# Patient Record
Sex: FEMALE | Race: WHITE | NOT HISPANIC OR LATINO | Employment: PART TIME | URBAN - METROPOLITAN AREA
[De-identification: names, ages, dates, MRNs, and addresses within clinical notes are randomized per-mention and may not be internally consistent; named-entity substitution may affect disease eponyms.]

---

## 2018-02-19 ENCOUNTER — OFFICE VISIT (OUTPATIENT)
Dept: URGENT CARE | Facility: CLINIC | Age: 38
End: 2018-02-19
Payer: COMMERCIAL

## 2018-02-19 VITALS
OXYGEN SATURATION: 97 % | SYSTOLIC BLOOD PRESSURE: 136 MMHG | WEIGHT: 124 LBS | HEIGHT: 64 IN | HEART RATE: 108 BPM | RESPIRATION RATE: 19 BRPM | TEMPERATURE: 99 F | BODY MASS INDEX: 21.17 KG/M2 | DIASTOLIC BLOOD PRESSURE: 89 MMHG

## 2018-02-19 DIAGNOSIS — R30.0 DYSURIA: Primary | ICD-10-CM

## 2018-02-19 DIAGNOSIS — J32.9 SINUSITIS, UNSPECIFIED CHRONICITY, UNSPECIFIED LOCATION: ICD-10-CM

## 2018-02-19 LAB
BILIRUB UR QL STRIP: NEGATIVE
GLUCOSE UR QL STRIP: NEGATIVE
KETONES UR QL STRIP: NEGATIVE
LEUKOCYTE ESTERASE UR QL STRIP: NEGATIVE
PH, POC UA: 7 (ref 5–8)
POC BLOOD, URINE: NEGATIVE
POC NITRATES, URINE: NEGATIVE
PROT UR QL STRIP: NEGATIVE
SP GR UR STRIP: 1 (ref 1–1.03)
UROBILINOGEN UR STRIP-ACNC: NORMAL (ref 0.1–1.1)

## 2018-02-19 PROCEDURE — 81003 URINALYSIS AUTO W/O SCOPE: CPT | Mod: QW,S$GLB,, | Performed by: EMERGENCY MEDICINE

## 2018-02-19 PROCEDURE — 3008F BODY MASS INDEX DOCD: CPT | Mod: S$GLB,,, | Performed by: NURSE PRACTITIONER

## 2018-02-19 PROCEDURE — 99214 OFFICE O/P EST MOD 30 MIN: CPT | Mod: 25,S$GLB,, | Performed by: NURSE PRACTITIONER

## 2018-02-19 RX ORDER — AMOXICILLIN AND CLAVULANATE POTASSIUM 875; 125 MG/1; MG/1
1 TABLET, FILM COATED ORAL 2 TIMES DAILY
Qty: 20 TABLET | Refills: 0 | Status: SHIPPED | OUTPATIENT
Start: 2018-02-19 | End: 2018-03-01

## 2018-02-20 NOTE — PATIENT INSTRUCTIONS
"Please return here or go to the Emergency Department for any concerns or worsening of condition.  If you were prescribed antibiotics, please take them to completion.  If you were prescribed a narcotic medication, do not drive or operate heavy equipment or machinery while taking these medications.  Please follow up with your primary care doctor or specialist as needed.  Please drink plenty of fluids.  Please get plenty of rest.  If you were prescribed Pyridium (phenazopyridine), please be aware that if you wear contact lens that this medication may stain your contacts.  While taking this medication it is recommended that you do not wear your contacts until 24 hours after your last dose.  Please follow up with your primary care doctor or specialist as needed.    If you  smoke, please stop smoking.  Dysuria     Painful urination (dysuria) is often caused by a problem in the urinary tract.   Dysuria is pain felt during urination. It is often described as a burning. Learn more about this problem and how it can be treated.  What causes dysuria?  Possible causes include:  · Infection with a bacteria or virus such as a urinary tract infection (UTI or a sexually transmitted infection (STI)  · Sensitivity or allergy to chemicals such as those found in lotions and other products  · Prostate or bladder problems  · Radiation therapy to the pelvic area  How is dysuria diagnosed?  Your healthcare provider will examine you. He or she will ask about your symptoms and health. After talking with you and doing a physical exam, your healthcare provider may know what is causing your dysuria. He or she will usually request  a sample of your urine. Tests of your urine, or a "urinalysis," are done. A urinalysis may include:  · Looking at the urine sample (visual exam)  · Checking for substances (chemical exam)  · Looking at a small amount under a microscope (microscopic exam)  Some parts of the urinalysis may be done in the provider's " office and some in a lab. And, the urine sample may be checked for bacteria and yeast (urine culture). Your healthcare provider will tell you more about these tests if they are needed.  How is dysuria treated?  Treatment depends on the cause. If you have a bacterial infection, you may need antibiotics. You may be given medicines to make it easier for you to urinate and help relieve pain. Your healthcare provider can tell you more about your treatment options. Untreated, symptoms may get worse.  When to call your healthcare provider  Call the healthcare provider right away if you have any of the following:  · Fever of 100.4°F (38°C) or higher   · No improvement after three days of treatment  · Trouble urinating because of pain  · New or increased discharge from the vagina or penis  · Rash or joint pain  · Increased back or abdominal pain  · Enlarged painful lymph nodes (lumps) in the groin   Date Last Reviewed: 1/1/2017  © 0062-6194 [employer]. [address] [place] [address]. All rights reserved. This information is not intended as a substitute for professional medical care. Always follow your healthcare professional's instructions.        Sinusitis (Antibiotic Treatment)    The sinuses are air-filled spaces within the bones of the face. They connect to the inside of the nose. Sinusitis is an inflammation of the tissue lining the sinus cavity. Sinus inflammation can occur during a cold. It can also be due to allergies to pollens and other particles in the air. Sinusitis can cause symptoms of sinus congestion and fullness. A sinus infection causes fever, headache and facial pain. There is often green or yellow drainage from the nose or into the back of the throat (post-nasal drip). You have been given antibiotics to treat this condition.  Home care:  · Take the full course of antibiotics as instructed. Do not stop taking them, even if you feel better.  · Drink plenty of water, hot tea, and  other liquids. This may help thin mucus. It also may promote sinus drainage.  · Heat may help soothe painful areas of the face. Use a towel soaked in hot water. Or,  the shower and direct the hot spray onto your face. Using a vaporizer along with a menthol rub at night may also help.   · An expectorant containing guaifenesin may help thin the mucus and promote drainage from the sinuses.  · Over-the-counter decongestants may be used unless a similar medicine was prescribed. Nasal sprays work the fastest. Use one that contains phenylephrine or oxymetazoline. First blow the nose gently. Then use the spray. Do not use these medicines more often than directed on the label or symptoms may get worse. You may also use tablets containing pseudoephedrine. Avoid products that combine ingredients, because side effects may be increased. Read labels. You can also ask the pharmacist for help. (NOTE: Persons with high blood pressure should not use decongestants. They can raise blood pressure.)  · Over-the-counter antihistamines may help if allergies contributed to your sinusitis.    · Do not use nasal rinses or irrigation during an acute sinus infection, unless told to by your health care provider. Rinsing may spread the infection to other sinuses.  · Use acetaminophen or ibuprofen to control pain, unless another pain medicine was prescribed. (If you have chronic liver or kidney disease or ever had a stomach ulcer, talk with your doctor before using these medicines. Aspirin should never be used in anyone under 18 years of age who is ill with a fever. It may cause severe liver damage.)  · Don't smoke. This can worsen symptoms.  Follow-up care  Follow up with your healthcare provider or our staff if you are not improving within the next week.  When to seek medical advice  Call your healthcare provider if any of these occur:  · Facial pain or headache becoming more severe  · Stiff neck  · Unusual drowsiness or  confusion  · Swelling of the forehead or eyelids  · Vision problems, including blurred or double vision  · Fever of 100.4ºF (38ºC) or higher, or as directed by your healthcare provider  · Seizure  · Breathing problems  · Symptoms not resolving within 10 days  Date Last Reviewed: 4/13/2015  © 5148-4356 [employer]. [address] [place] [address], [place] [address]. All rights reserved. This information is not intended as a substitute for professional medical care. Always follow your healthcare professional's instructions.

## 2018-02-20 NOTE — PROGRESS NOTES
"Subjective:       Patient ID: Frida Benton is a 37 y.o. female.    Vitals:  height is 5' 4" (1.626 m) and weight is 56.2 kg (124 lb). Her oral temperature is 99 °F (37.2 °C). Her blood pressure is 136/89 and her pulse is 108. Her respiration is 19 and oxygen saturation is 97%.     Chief Complaint: Urinary Tract Infection    Patient states she been having some uti symptoms since this morning.  PATIENT ALSO REPORTS SINUS PRESSURE, HEADACHE AND COUGH X SEVERAL DAYS      Urinary Tract Infection    This is a new problem. The current episode started today. The problem has been gradually worsening. The quality of the pain is described as burning. The pain is at a severity of 2/10. The pain is mild. There has been no fever. Associated symptoms include frequency and urgency. Pertinent negatives include no chills, hematuria, nausea, possible pregnancy or vomiting. She has tried nothing for the symptoms. The treatment provided no relief.     Review of Systems   Constitution: Negative for chills and fever.   Skin: Negative for itching.   Musculoskeletal: Negative for back pain.   Gastrointestinal: Negative for abdominal pain, nausea and vomiting.   Genitourinary: Positive for frequency and urgency. Negative for dysuria, genital sores, hematuria, missed menses, non-menstrual bleeding and pelvic pain.       Objective:      Physical Exam   Constitutional: She is oriented to person, place, and time. She appears well-developed and well-nourished.   HENT:   Head: Normocephalic and atraumatic.   Right Ear: External ear normal.   Left Ear: External ear normal.   Nose: No nasal deformity. No epistaxis. Right sinus exhibits maxillary sinus tenderness. Left sinus exhibits maxillary sinus tenderness.   Mouth/Throat: Oropharynx is clear and moist and mucous membranes are normal.   Eyes: Conjunctivae and lids are normal.   Neck: Trachea normal, normal range of motion and phonation normal. Neck supple.   Cardiovascular: Normal rate, regular " rhythm, normal heart sounds and normal pulses.    Pulmonary/Chest: Effort normal and breath sounds normal.   Abdominal: Soft. Normal appearance and bowel sounds are normal. She exhibits no distension and no mass. There is no tenderness. There is no CVA tenderness.   Neurological: She is alert and oriented to person, place, and time.   Skin: Skin is warm, dry and intact.   Psychiatric: She has a normal mood and affect. Her speech is normal and behavior is normal. Cognition and memory are normal.   Nursing note and vitals reviewed.      Office Visit on 02/19/2018   Component Date Value Ref Range Status    POC Blood, Urine 02/19/2018 Negative  Negative Final    POC Bilirubin, Urine 02/19/2018 Negative  Negative Final    POC Urobilinogen, Urine 02/19/2018 Normal  0.1 - 1.1 Final    POC Ketones, Urine 02/19/2018 Negative  Negative Final    POC Protein, Urine 02/19/2018 Negative  Negative Final    POC Nitrates, Urine 02/19/2018 Negative  Negative Final    POC Glucose, Urine 02/19/2018 Negative  Negative Final    pH, UA 02/19/2018 7.0  5 - 8 Final    POC Specific Gravity, Urine 02/19/2018 1.005  1.003 - 1.029 Final    POC Leukocytes, Urine 02/19/2018 Negative  Negative Final     Assessment:       1. Dysuria    2. Sinusitis, unspecified chronicity, unspecified location        Plan:         Dysuria  -     POCT Urinalysis, Dipstick, Automated, W/O Scope  -     Urine culture  -     amoxicillin-clavulanate 875-125mg (AUGMENTIN) 875-125 mg per tablet; Take 1 tablet by mouth 2 (two) times daily.  Dispense: 20 tablet; Refill: 0    Sinusitis, unspecified chronicity, unspecified location  -     amoxicillin-clavulanate 875-125mg (AUGMENTIN) 875-125 mg per tablet; Take 1 tablet by mouth 2 (two) times daily.  Dispense: 20 tablet; Refill: 0      Patient Instructions     Please return here or go to the Emergency Department for any concerns or worsening of condition.  If you were prescribed antibiotics, please take them to  "completion.  If you were prescribed a narcotic medication, do not drive or operate heavy equipment or machinery while taking these medications.  Please follow up with your primary care doctor or specialist as needed.  Please drink plenty of fluids.  Please get plenty of rest.  If you were prescribed Pyridium (phenazopyridine), please be aware that if you wear contact lens that this medication may stain your contacts.  While taking this medication it is recommended that you do not wear your contacts until 24 hours after your last dose.  Please follow up with your primary care doctor or specialist as needed.    If you  smoke, please stop smoking.  Dysuria     Painful urination (dysuria) is often caused by a problem in the urinary tract.   Dysuria is pain felt during urination. It is often described as a burning. Learn more about this problem and how it can be treated.  What causes dysuria?  Possible causes include:  · Infection with a bacteria or virus such as a urinary tract infection (UTI or a sexually transmitted infection (STI)  · Sensitivity or allergy to chemicals such as those found in lotions and other products  · Prostate or bladder problems  · Radiation therapy to the pelvic area  How is dysuria diagnosed?  Your healthcare provider will examine you. He or she will ask about your symptoms and health. After talking with you and doing a physical exam, your healthcare provider may know what is causing your dysuria. He or she will usually request  a sample of your urine. Tests of your urine, or a "urinalysis," are done. A urinalysis may include:  · Looking at the urine sample (visual exam)  · Checking for substances (chemical exam)  · Looking at a small amount under a microscope (microscopic exam)  Some parts of the urinalysis may be done in the provider's office and some in a lab. And, the urine sample may be checked for bacteria and yeast (urine culture). Your healthcare provider will tell you more about these " tests if they are needed.  How is dysuria treated?  Treatment depends on the cause. If you have a bacterial infection, you may need antibiotics. You may be given medicines to make it easier for you to urinate and help relieve pain. Your healthcare provider can tell you more about your treatment options. Untreated, symptoms may get worse.  When to call your healthcare provider  Call the healthcare provider right away if you have any of the following:  · Fever of 100.4°F (38°C) or higher   · No improvement after three days of treatment  · Trouble urinating because of pain  · New or increased discharge from the vagina or penis  · Rash or joint pain  · Increased back or abdominal pain  · Enlarged painful lymph nodes (lumps) in the groin   Date Last Reviewed: 1/1/2017 © 2000-2017 [employer]. [address] [place] [address]. All rights reserved. This information is not intended as a substitute for professional medical care. Always follow your healthcare professional's instructions.        Sinusitis (Antibiotic Treatment)    The sinuses are air-filled spaces within the bones of the face. They connect to the inside of the nose. Sinusitis is an inflammation of the tissue lining the sinus cavity. Sinus inflammation can occur during a cold. It can also be due to allergies to pollens and other particles in the air. Sinusitis can cause symptoms of sinus congestion and fullness. A sinus infection causes fever, headache and facial pain. There is often green or yellow drainage from the nose or into the back of the throat (post-nasal drip). You have been given antibiotics to treat this condition.  Home care:  · Take the full course of antibiotics as instructed. Do not stop taking them, even if you feel better.  · Drink plenty of water, hot tea, and other liquids. This may help thin mucus. It also may promote sinus drainage.  · Heat may help soothe painful areas of the face. Use a towel soaked in hot water.  Or,  the shower and direct the hot spray onto your face. Using a vaporizer along with a menthol rub at night may also help.   · An expectorant containing guaifenesin may help thin the mucus and promote drainage from the sinuses.  · Over-the-counter decongestants may be used unless a similar medicine was prescribed. Nasal sprays work the fastest. Use one that contains phenylephrine or oxymetazoline. First blow the nose gently. Then use the spray. Do not use these medicines more often than directed on the label or symptoms may get worse. You may also use tablets containing pseudoephedrine. Avoid products that combine ingredients, because side effects may be increased. Read labels. You can also ask the pharmacist for help. (NOTE: Persons with high blood pressure should not use decongestants. They can raise blood pressure.)  · Over-the-counter antihistamines may help if allergies contributed to your sinusitis.    · Do not use nasal rinses or irrigation during an acute sinus infection, unless told to by your health care provider. Rinsing may spread the infection to other sinuses.  · Use acetaminophen or ibuprofen to control pain, unless another pain medicine was prescribed. (If you have chronic liver or kidney disease or ever had a stomach ulcer, talk with your doctor before using these medicines. Aspirin should never be used in anyone under 18 years of age who is ill with a fever. It may cause severe liver damage.)  · Don't smoke. This can worsen symptoms.  Follow-up care  Follow up with your healthcare provider or our staff if you are not improving within the next week.  When to seek medical advice  Call your healthcare provider if any of these occur:  · Facial pain or headache becoming more severe  · Stiff neck  · Unusual drowsiness or confusion  · Swelling of the forehead or eyelids  · Vision problems, including blurred or double vision  · Fever of 100.4ºF (38ºC) or higher, or as directed by your healthcare  provider  · Seizure  · Breathing problems  · Symptoms not resolving within 10 days  Date Last Reviewed: 4/13/2015  © 6200-7387 [employer]. [address] [place] [address], [place] [address]. All rights reserved. This information is not intended as a substitute for professional medical care. Always follow your healthcare professional's instructions.

## 2018-02-24 LAB
BACTERIA UR CULT: ABNORMAL
BACTERIA UR CULT: ABNORMAL

## 2018-02-26 ENCOUNTER — TELEPHONE (OUTPATIENT)
Dept: URGENT CARE | Facility: CLINIC | Age: 38
End: 2018-02-26

## 2018-02-26 NOTE — TELEPHONE ENCOUNTER
----- Message from Zulema Montanez MD sent at 2/24/2018  9:44 AM CST -----  Notify the patient that their  lab tests did result abnormal.Pt on augmentin because she also had a sinus infection. That should work. Check how she is doing. If still having urinary symptoms I may have to phone in a different medication. However they  have already been given the appropriate medication and should finish the meds as prescribed

## 2018-03-07 ENCOUNTER — TELEPHONE (OUTPATIENT)
Dept: URGENT CARE | Facility: CLINIC | Age: 38
End: 2018-03-07

## 2018-04-03 ENCOUNTER — OFFICE VISIT (OUTPATIENT)
Dept: OBSTETRICS AND GYNECOLOGY | Facility: CLINIC | Age: 38
End: 2018-04-03
Payer: COMMERCIAL

## 2018-04-03 VITALS
HEIGHT: 64 IN | WEIGHT: 128.31 LBS | DIASTOLIC BLOOD PRESSURE: 74 MMHG | BODY MASS INDEX: 21.91 KG/M2 | SYSTOLIC BLOOD PRESSURE: 120 MMHG

## 2018-04-03 DIAGNOSIS — Z12.4 PAP SMEAR FOR CERVICAL CANCER SCREENING: Primary | ICD-10-CM

## 2018-04-03 PROCEDURE — 88175 CYTOPATH C/V AUTO FLUID REDO: CPT

## 2018-04-03 PROCEDURE — 99999 PR PBB SHADOW E&M-EST. PATIENT-LVL III: CPT | Mod: PBBFAC,,, | Performed by: OBSTETRICS & GYNECOLOGY

## 2018-04-03 PROCEDURE — 87624 HPV HI-RISK TYP POOLED RSLT: CPT

## 2018-04-03 PROCEDURE — 99385 PREV VISIT NEW AGE 18-39: CPT | Mod: S$GLB,,, | Performed by: OBSTETRICS & GYNECOLOGY

## 2018-04-03 NOTE — PROGRESS NOTES
"Chief Complaint: Well Woman Exam     HPI:      Frida Benton is a 37 y.o.  who presents today for well woman exam.  LMP: Patient's last menstrual period was 2018 (approximate).  No issues, problems, or complaints. Specifically, patient denies abnormal vaginal bleeding, discharge, pelvic pain, urinary problems, or changes in appetite. Ms. Benton is currently sexually active with a single male partner. She is currently using no method for contraception as she and her partner are open to conception.    Previous Pap:  no abnormalities per patient ()    Ms. Benton confirms that she wears her seatbelt when riding in the car and does not text while driving.     OB History      Para Term  AB Living    1 1 1     1    SAB TAB Ectopic Multiple Live Births            1        ROS:     GENERAL: Denies unintentional weight gain or weight loss. Feeling well overall.   SKIN: Denies rash or lesions.   HEENT: Denies headaches, or vision changes.   CARDIOVASCULAR: Denies palpitations or chest pain.   RESPIRATORY: Denies shortness of breath or dyspnea on exertion.  BREASTS: Denies pain, lumps, or nipple discharge.   ABDOMEN: Denies abdominal pain, constipation, diarrhea, nausea, vomiting, change in appetite.  URINARY: Denies frequency, dysuria, hematuria.  NEUROLOGIC: Denies syncope or weakness.   PSYCHIATRIC: Denies depression, anxiety or mood swings.    Physical Exam:      PHYSICAL EXAM:  /74   Ht 5' 4" (1.626 m)   Wt 58.2 kg (128 lb 4.9 oz)   LMP 2018 (Approximate)   BMI 22.02 kg/m²   Body mass index is 22.02 kg/m².     APPEARANCE: Well nourished, well developed, in no acute distress.  PSYCH: Appropriate mood and affect.  SKIN: No acne or hirsutism  NECK: Neck symmetric without masses or thyromegaly  NODES: No inguinal, axillary, or supraclavicular lymph node enlargement  ABDOMEN: Soft.  No tenderness or masses.    CARDIOVASCULAR: No edema of peripheral extremities  BREASTS: Symmetrical, " no skin changes or visible lesions.  Bilateral lumpy bumpy quality to both breasts; no nipple discharge bilaterally.  PELVIC: Normal external genitalia without lesions.  Normal hair distribution.  Adequate perineal body, normal urethral meatus.  Vagina moist and well rugated without lesions or discharge.  Cervix pink, without lesions, discharge or tenderness.  No significant cystocele or rectocele.  Bimanual exam shows uterus to be normal size, regular, mobile and nontender.  Adnexa without masses or tenderness.      Assessment/Plan:     Pap smear for cervical cancer screening  -     Liquid-based pap smear, screening  -     HPV High Risk Genotypes, PCR      Counseling:     Patient was counseled today on current ASCCP pap guidelines, the recommendation for yearly pelvic exams, healthy diet and exercise routines, breast self awareness.She is to see her PCP for other health maintenance.     Use of the [facility] Patient Portal discussed and encouraged during today's visit.

## 2018-04-06 LAB
HPV16 AG SPEC QL: NEGATIVE
HPV16+18+H RISK 12 DNA CVX-IMP: POSITIVE
HPV18 DNA SPEC QL NAA+PROBE: NEGATIVE

## 2018-04-07 ENCOUNTER — PATIENT MESSAGE (OUTPATIENT)
Dept: OBSTETRICS AND GYNECOLOGY | Facility: CLINIC | Age: 38
End: 2018-04-07

## 2018-04-10 ENCOUNTER — PATIENT MESSAGE (OUTPATIENT)
Dept: OBSTETRICS AND GYNECOLOGY | Facility: CLINIC | Age: 38
End: 2018-04-10

## 2018-04-10 ENCOUNTER — PATIENT MESSAGE (OUTPATIENT)
Dept: OBSTETRICS AND GYNECOLOGY | Facility: HOSPITAL | Age: 38
End: 2018-04-10

## 2018-12-29 ENCOUNTER — OFFICE VISIT (OUTPATIENT)
Dept: URGENT CARE | Facility: CLINIC | Age: 38
End: 2018-12-29
Payer: COMMERCIAL

## 2018-12-29 VITALS
WEIGHT: 128 LBS | DIASTOLIC BLOOD PRESSURE: 76 MMHG | SYSTOLIC BLOOD PRESSURE: 120 MMHG | OXYGEN SATURATION: 100 % | HEIGHT: 64 IN | HEART RATE: 99 BPM | BODY MASS INDEX: 21.85 KG/M2 | TEMPERATURE: 100 F | RESPIRATION RATE: 16 BRPM

## 2018-12-29 DIAGNOSIS — R10.84 COLICKY ABDOMINAL PAIN: ICD-10-CM

## 2018-12-29 DIAGNOSIS — K59.00 CONSTIPATION, UNSPECIFIED CONSTIPATION TYPE: ICD-10-CM

## 2018-12-29 DIAGNOSIS — R10.31 RIGHT LOWER QUADRANT ABDOMINAL PAIN: Primary | ICD-10-CM

## 2018-12-29 DIAGNOSIS — R50.9 FEVER, UNSPECIFIED FEVER CAUSE: ICD-10-CM

## 2018-12-29 LAB
B-HCG UR QL: NEGATIVE
BILIRUB UR QL STRIP: NEGATIVE
CTP QC/QA: YES
GLUCOSE UR QL STRIP: NEGATIVE
KETONES UR QL STRIP: POSITIVE
LEUKOCYTE ESTERASE UR QL STRIP: NEGATIVE
PH, POC UA: 6.5 (ref 5–8)
POC BLOOD, URINE: NEGATIVE
POC NITRATES, URINE: NEGATIVE
PROT UR QL STRIP: NEGATIVE
SP GR UR STRIP: 1.01 (ref 1–1.03)
UROBILINOGEN UR STRIP-ACNC: NORMAL (ref 0.1–1.1)

## 2018-12-29 PROCEDURE — 81003 URINALYSIS AUTO W/O SCOPE: CPT | Mod: QW,S$GLB,, | Performed by: NURSE PRACTITIONER

## 2018-12-29 PROCEDURE — 74019 RADEX ABDOMEN 2 VIEWS: CPT | Mod: S$GLB,,, | Performed by: RADIOLOGY

## 2018-12-29 PROCEDURE — 81025 URINE PREGNANCY TEST: CPT | Mod: S$GLB,,, | Performed by: NURSE PRACTITIONER

## 2018-12-29 PROCEDURE — 99214 OFFICE O/P EST MOD 30 MIN: CPT | Mod: 25,S$GLB,, | Performed by: NURSE PRACTITIONER

## 2018-12-29 PROCEDURE — 3008F BODY MASS INDEX DOCD: CPT | Mod: CPTII,S$GLB,, | Performed by: NURSE PRACTITIONER

## 2018-12-29 RX ORDER — DICYCLOMINE HYDROCHLORIDE 20 MG/1
20 TABLET ORAL 3 TIMES DAILY PRN
Qty: 30 TABLET | Refills: 0 | Status: SHIPPED | OUTPATIENT
Start: 2018-12-29 | End: 2019-04-22

## 2018-12-29 NOTE — PATIENT INSTRUCTIONS
Follow up with your doctor in a few days.  Return to the urgent care or go to the ER if symptoms get worse.    Xray shows signs of constipation. Follow the otc guidelines below and follow up if no improvement.  If the xray shows something in addition once radiology do the final read, we willc all you.   ER precautions: pain severe, with nausea/vomiting.  Follow up with primary care if symptoms persist.  Tylenol and/or motrin for fever.      TAKE MIRALAX OTC TWICE DAILY AS NEEDED, ALONG WITH COLACE AS DIRECTED.  TAKE FIBER, METAMUCIL, AND ADD TO YOUR DIET DAILY.  INCREASE FLUID INTAKE TO 8-10 GLASSES/WATER DAILY.   DULCOLAX SUPPOSITORY AS DIRECTED AND NEEDED FOR CONSTIPATION.    PLEASE FOLLOW UP WITH PRIMARY CARE IF SYMPTOMS DO NOT IMPROVE WITH BOWEL MOVEMENTS.    SEE HANDOUT ON CONSTIPATION AND ABDOMINAL PAIN.      Constipation (Adult)  Constipation means that you have bowel movements that are less frequent than usual. Stools often become very hard and difficult to pass.  Constipation is very common. At some point in life it affects almost everyone. Since everyone's bowel habits are different, what is constipation to one person may not be to another. Your healthcare provider may do tests to diagnose constipation. It depends on what he or she finds when evaluating you.    Symptoms of constipation include:  · Abdominal pain  · Bloating  · Vomiting  · Painful bowel movements  · Itching, swelling, bleeding, or pain around the anus  Causes  Constipation can have many causes. These include:  · Diet low in fiber  · Too much dairy  · Not drinking enough liquids  · Lack of exercise or physical activity. This is especially true for older adults.  · Changes in lifestyle or daily routine, including pregnancy, aging, work, and travel  · Frequent use or misuse of laxatives  · Ignoring the urge to have a bowel movement or delaying it until later  · Medicines, such as certain prescription pain medicines, iron supplements, antacids,  certain antidepressants, and calcium supplements  · Diseases like irritable bowel syndrome, bowel obstructions, stroke, diabetes, thyroid disease, Parkinson disease, hemorrhoids, and colon cancer  Complications  Potential complications of constipation can include:  · Hemorrhoids  · Rectal bleeding from hemorrhoids or anal fissures (skin tears)  · Hernias  · Dependency on laxatives  · Chronic constipation  · Fecal impaction  · Bowel obstruction or perforation  Home care  All treatment should be done after talking with your healthcare provider. This is especially true if you have another medical problems, are taking prescription medicines, or are an older adult. Treatment most often involves lifestyle changes. You may also need medicines. Your healthcare provider will tell you which will work best for you. Follow the advice below to help avoid this problem in the future.  Lifestyle changes  These lifestyle changes can help prevent constipation:  · Diet. Eat a high-fiber diet, with fresh fruit and vegetables, and reduce dairy intake, meats, and processed foods  · Fluids. It's important to get enough fluids each day. Drink plenty of water when you eat more fiber. If you are on diet that limits the amount of fluid you can have, talk about this with your healthcare provider.  · Regular exercise. Check with your healthcare provider first.  Medications  Take any medicines as directed. Some laxatives are safe to use only every now and then. Others can be taken on a regular basis. Talk with your doctor or pharmacist if you have questions.  Prescription pain medicines can cause constipation. If you are taking this kind of medicine, ask your healthcare provider if you should also take a stool softener.  Medicines you may take to treat constipation include:  · Fiber supplements  · Stool softeners  · Laxatives  · Enemas  · Rectal suppositories  Follow-up care  Follow up with your healthcare provider if symptoms don't get better  in the next few days. You may need to have more tests or see a specialist.  Call 911  Call 911 if any of these occur:  · Trouble breathing  · Stiff, rigid abdomen that is severely painful to touch  · Confusion  · Fainting or loss of consciousness  · Rapid heart rate  · Chest pain  When to seek medical advice  Call your healthcare provider right away if any of these occur:  · Fever over 100.4°F (38°C)  · Failure to resume normal bowel movements  · Pain in your abdomen or back gets worse  · Nausea or vomiting  · Swelling in your abdomen  · Blood in the stool  · Black, tarry stool  · Involuntary weight loss  · Weakness  Date Last Reviewed: 12/30/2015  © 8319-2944 [employer]. [address] [place] [address], [place] [address]. All rights reserved. This information is not intended as a substitute for professional medical care. Always follow your healthcare professional's instructions.        Unknown Causes of Abdominal Pain (Female)    The exact cause of your abdominal (stomach) pain is not clear. This does not mean that this is something to worry about. Everyone likes to know the exact cause of the problem, but sometimes with abdominal pain, there is no clear-cut cause, and this could be a good thing. The good news is that your symptoms can be treated, and you will feel better.   Your condition does not seem serious now; however, sometimes the signs of a serious problem may take more time to appear. For this reason, it is important for you to watch for any new symptoms, problems, or worsening of your condition.  Over the next few days, the abdominal pain may come and go, or be continuous. Other common symptoms can include nausea and vomiting. Sometimes it can be difficult to tell if you feel nauseous, you may just feel bad and not associate that feeling with nausea. Constipation, diarrhea, and a fever may go along with the pain.  The pain may continue even if treated correctly over the following days. Depending on  how things go, sometimes the cause can become clear and may require further or different treatment. Additional evaluations, medications, or tests may also be needed.  Home care  Your healthcare provider may prescribe medicine for pain, symptoms, or an infection.  Follow the healthcare provider's instructions for taking these medicines.  General care  · Rest as much as you can until your next exam. No strenuous activities.  · Try to find positions that ease discomfort. A small pillow placed on the abdomen may help relieve pain.  · Something warm on your abdomen (such as a heating pad) may help, but be careful not to burn yourself.  Diet  · Do not force yourself to eat, especially if having cramps, vomiting, or diarrhea.  · Water is important so you do not get dehydrated. Soup may also be good. Sports drinks may also help, especially if they are not too acidic. Make sure you don't drink sugary drinks as this can make things worse. Take liquids in small amounts. Do not guzzle them.  · Caffeine sometimes makes the pain and cramping worse.  · Avoid dairy products if you have vomiting or diarrhea.  · Don't eat large amounts at a time. Wait a few minutes between bites.  · Eat a diet low in fiber (called a low-residue diet). Foods allowed include refined breads, white rice, fruit and vegetable juices without pulp, tender meats. These foods will pass more easily through the intestine.  · Avoid whole-grain foods, whole fruits and vegetables, meats, seeds and nuts, fried or fatty foods, dairy, alcohol and spicy foods until your symptoms go away.  Follow-up care  Follow up with your healthcare provider, or as advised, if your pain does not begin to improve in the next 24 hours.  Call 911  Call 911 if any of these occur:  · Trouble breathing  · Confusion  · Fainting or loss of consciousness  · Rapid heart rate  · Seizure  When to seek medical advice  Call your healthcare provider right away if any of these occur:  · Pain gets  worse or moves to the right lower abdomen  · New or worsening vomiting or diarrhea  · Swelling of the abdomen  · Unable to pass stool for more than 3 days  · Fever of 100.4ºF (38ºC) or higher, or as directed by your healthcare provider.  · Blood in vomit or bowel movements (dark red or black color)  · Jaundice (yellow color of eyes and skin)  · Weakness, dizziness  · Chest, arm, back, neck or jaw pain  · Unexpected vaginal bleeding or missed period  · Can't keep down liquids or water and are getting dehydrated  Date Last Reviewed: 12/30/2015  © 5152-8993 [employer]. [address] [place] [address], [place] [address]. All rights reserved. This information is not intended as a substitute for professional medical care. Always follow your healthcare professional's instructions.

## 2018-12-29 NOTE — PROGRESS NOTES
"Subjective:       Patient ID: Frida Benton is a 38 y.o. female.    Vitals:  height is 5' 4" (1.626 m) and weight is 58.1 kg (128 lb). Her temperature is 100.2 °F (37.9 °C). Her blood pressure is 120/76 and her pulse is 99. Her respiration is 16 and oxygen saturation is 100%.     Chief Complaint: Fever    Pt states she has abdominal cramps and fever x 2 days. No hx of renal stones, uti, appendicitis, diverticulitus, reflux. Denies n/v/d. Reports last stool this morning-normal. Reports pain comes and goes-diffuse crampy pain. Reports cycle to start in 10-11 days. Reports she has been trying to become pregnant-so no birth control method at this time. No hx of ovarian cyst. Denies vaginal bleeding.      Fever    This is a new problem. The current episode started yesterday. The problem has been unchanged. Associated symptoms include abdominal pain. Pertinent negatives include no congestion, coughing, ear pain, nausea, rash, sore throat, vomiting or wheezing.       Constitution: Positive for fever. Negative for chills, sweating and fatigue.   HENT: Negative for ear pain, congestion, sinus pain, sinus pressure, sore throat and voice change.    Neck: Negative for painful lymph nodes.   Eyes: Negative for eye redness.   Respiratory: Negative for chest tightness, cough, sputum production, bloody sputum, COPD, shortness of breath, stridor, wheezing and asthma.    Gastrointestinal: Positive for abdominal pain. Negative for nausea and vomiting.   Musculoskeletal: Negative for muscle ache.   Skin: Negative for rash.   Allergic/Immunologic: Negative for seasonal allergies and asthma.   Hematologic/Lymphatic: Negative for swollen lymph nodes.       Objective:      Physical Exam   Constitutional: She is oriented to person, place, and time. She appears well-developed and well-nourished.   HENT:   Head: Normocephalic and atraumatic.   Right Ear: External ear normal.   Left Ear: External ear normal.   Nose: Nose normal.   Mouth/Throat: " Mucous membranes are normal.   Eyes: Conjunctivae and lids are normal.   Neck: Trachea normal and full passive range of motion without pain. Neck supple.   Cardiovascular: Normal rate, regular rhythm and normal heart sounds.   Pulmonary/Chest: Effort normal and breath sounds normal. No respiratory distress.   Abdominal: Soft. Normal appearance and bowel sounds are normal. She exhibits no distension, no abdominal bruit, no pulsatile midline mass and no mass. There is tenderness in the right lower quadrant. There is no rigidity, no rebound, no guarding, no CVA tenderness, no tenderness at McBurney's point and negative [place]'s sign.   Able to jump off table-negative heel jar test   Musculoskeletal: Normal range of motion. She exhibits no edema.   Neurological: She is alert and oriented to person, place, and time. She has normal strength.   Skin: Skin is warm, dry and intact. She is not diaphoretic. No pallor.   Psychiatric: She has a normal mood and affect. Her speech is normal and behavior is normal. Judgment and thought content normal. Cognition and memory are normal.   Nursing note and vitals reviewed.      Results for orders placed or performed in visit on 12/29/18   POCT Urinalysis, Dipstick, Automated, W/O Scope   Result Value Ref Range    POC Blood, Urine Negative Negative    POC Bilirubin, Urine Negative Negative    POC Urobilinogen, Urine Normal 0.1 - 1.1    POC Ketones, Urine Positive (A) Negative    POC Protein, Urine Negative Negative    POC Nitrates, Urine Negative Negative    POC Glucose, Urine Negative Negative    pH, UA 6.5 5 - 8    POC Specific Gravity, Urine 1.010 1.003 - 1.029    POC Leukocytes, Urine Negative Negative   POCT urine pregnancy   Result Value Ref Range    POC Preg Test, Ur Negative Negative     Acceptable Yes      Xr Abdomen Flat And Erect    Result Date: 12/29/2018  EXAMINATION: XR ABDOMEN FLAT AND ERECT CLINICAL HISTORY: Right lower quadrant pain TECHNIQUE: Flat and  erect AP views of the abdomen were performed. COMPARISON: None FINDINGS: There is mild lumbar scoliosis convex to the left.  The bowel gas pattern is within normal limits.  No free air is seen.  No masses or calcifications are noted.  The psoas shadows are sharp     Mild scoliosis otherwise negative abdomen x-ray. Electronically signed by: Wilfredo Zhu MD Date:    12/29/2018 Time:    16:51    Assessment:       1. Right lower quadrant abdominal pain    2. Colicky abdominal pain    3. Fever, unspecified fever cause    4. Constipation, unspecified constipation type        Plan:         Right lower quadrant abdominal pain  -     POCT Urinalysis, Dipstick, Automated, W/O Scope  -     XR ABDOMEN FLAT AND ERECT; Future; Expected date: 12/29/2018    Colicky abdominal pain  -     POCT Urinalysis, Dipstick, Automated, W/O Scope  -     POCT urine pregnancy    Fever, unspecified fever cause    Constipation, unspecified constipation type    Other orders  -     dicyclomine (BENTYL) 20 mg tablet; Take 1 tablet (20 mg total) by mouth 3 (three) times daily as needed.  Dispense: 30 tablet; Refill: 0      Patient Instructions   Follow up with your doctor in a few days.  Return to the urgent care or go to the ER if symptoms get worse.    Xray shows signs of constipation. Follow the otc guidelines below and follow up if no improvement.  If the xray shows something in addition once radiology do the final read, we willc all you.   ER precautions: pain severe, with nausea/vomiting.  Follow up with primary care if symptoms persist.  Tylenol and/or motrin for fever.      TAKE MIRALAX OTC TWICE DAILY AS NEEDED, ALONG WITH COLACE AS DIRECTED.  TAKE FIBER, METAMUCIL, AND ADD TO YOUR DIET DAILY.  INCREASE FLUID INTAKE TO 8-10 GLASSES/WATER DAILY.   DULCOLAX SUPPOSITORY AS DIRECTED AND NEEDED FOR CONSTIPATION.    PLEASE FOLLOW UP WITH PRIMARY CARE IF SYMPTOMS DO NOT IMPROVE WITH BOWEL MOVEMENTS.    SEE HANDOUT ON CONSTIPATION AND ABDOMINAL  PAIN.      Constipation (Adult)  Constipation means that you have bowel movements that are less frequent than usual. Stools often become very hard and difficult to pass.  Constipation is very common. At some point in life it affects almost everyone. Since everyone's bowel habits are different, what is constipation to one person may not be to another. Your healthcare provider may do tests to diagnose constipation. It depends on what he or she finds when evaluating you.    Symptoms of constipation include:  · Abdominal pain  · Bloating  · Vomiting  · Painful bowel movements  · Itching, swelling, bleeding, or pain around the anus  Causes  Constipation can have many causes. These include:  · Diet low in fiber  · Too much dairy  · Not drinking enough liquids  · Lack of exercise or physical activity. This is especially true for older adults.  · Changes in lifestyle or daily routine, including pregnancy, aging, work, and travel  · Frequent use or misuse of laxatives  · Ignoring the urge to have a bowel movement or delaying it until later  · Medicines, such as certain prescription pain medicines, iron supplements, antacids, certain antidepressants, and calcium supplements  · Diseases like irritable bowel syndrome, bowel obstructions, stroke, diabetes, thyroid disease, Parkinson disease, hemorrhoids, and colon cancer  Complications  Potential complications of constipation can include:  · Hemorrhoids  · Rectal bleeding from hemorrhoids or anal fissures (skin tears)  · Hernias  · Dependency on laxatives  · Chronic constipation  · Fecal impaction  · Bowel obstruction or perforation  Home care  All treatment should be done after talking with your healthcare provider. This is especially true if you have another medical problems, are taking prescription medicines, or are an older adult. Treatment most often involves lifestyle changes. You may also need medicines. Your healthcare provider will tell you which will work best for you.  Follow the advice below to help avoid this problem in the future.  Lifestyle changes  These lifestyle changes can help prevent constipation:  · Diet. Eat a high-fiber diet, with fresh fruit and vegetables, and reduce dairy intake, meats, and processed foods  · Fluids. It's important to get enough fluids each day. Drink plenty of water when you eat more fiber. If you are on diet that limits the amount of fluid you can have, talk about this with your healthcare provider.  · Regular exercise. Check with your healthcare provider first.  Medications  Take any medicines as directed. Some laxatives are safe to use only every now and then. Others can be taken on a regular basis. Talk with your doctor or pharmacist if you have questions.  Prescription pain medicines can cause constipation. If you are taking this kind of medicine, ask your healthcare provider if you should also take a stool softener.  Medicines you may take to treat constipation include:  · Fiber supplements  · Stool softeners  · Laxatives  · Enemas  · Rectal suppositories  Follow-up care  Follow up with your healthcare provider if symptoms don't get better in the next few days. You may need to have more tests or see a specialist.  Call 911  Call 911 if any of these occur:  · Trouble breathing  · Stiff, rigid abdomen that is severely painful to touch  · Confusion  · Fainting or loss of consciousness  · Rapid heart rate  · Chest pain  When to seek medical advice  Call your healthcare provider right away if any of these occur:  · Fever over 100.4°F (38°C)  · Failure to resume normal bowel movements  · Pain in your abdomen or back gets worse  · Nausea or vomiting  · Swelling in your abdomen  · Blood in the stool  · Black, tarry stool  · Involuntary weight loss  · Weakness  Date Last Reviewed: 12/30/2015  © 5577-3884 [employer]. [address] [place] [address], [place] [address]. All rights reserved. This information is not intended as a substitute for  professional medical care. Always follow your healthcare professional's instructions.        Unknown Causes of Abdominal Pain (Female)    The exact cause of your abdominal (stomach) pain is not clear. This does not mean that this is something to worry about. Everyone likes to know the exact cause of the problem, but sometimes with abdominal pain, there is no clear-cut cause, and this could be a good thing. The good news is that your symptoms can be treated, and you will feel better.   Your condition does not seem serious now; however, sometimes the signs of a serious problem may take more time to appear. For this reason, it is important for you to watch for any new symptoms, problems, or worsening of your condition.  Over the next few days, the abdominal pain may come and go, or be continuous. Other common symptoms can include nausea and vomiting. Sometimes it can be difficult to tell if you feel nauseous, you may just feel bad and not associate that feeling with nausea. Constipation, diarrhea, and a fever may go along with the pain.  The pain may continue even if treated correctly over the following days. Depending on how things go, sometimes the cause can become clear and may require further or different treatment. Additional evaluations, medications, or tests may also be needed.  Home care  Your healthcare provider may prescribe medicine for pain, symptoms, or an infection.  Follow the healthcare provider's instructions for taking these medicines.  General care  · Rest as much as you can until your next exam. No strenuous activities.  · Try to find positions that ease discomfort. A small pillow placed on the abdomen may help relieve pain.  · Something warm on your abdomen (such as a heating pad) may help, but be careful not to burn yourself.  Diet  · Do not force yourself to eat, especially if having cramps, vomiting, or diarrhea.  · Water is important so you do not get dehydrated. Soup may also be good. Sports  drinks may also help, especially if they are not too acidic. Make sure you don't drink sugary drinks as this can make things worse. Take liquids in small amounts. Do not guzzle them.  · Caffeine sometimes makes the pain and cramping worse.  · Avoid dairy products if you have vomiting or diarrhea.  · Don't eat large amounts at a time. Wait a few minutes between bites.  · Eat a diet low in fiber (called a low-residue diet). Foods allowed include refined breads, white rice, fruit and vegetable juices without pulp, tender meats. These foods will pass more easily through the intestine.  · Avoid whole-grain foods, whole fruits and vegetables, meats, seeds and nuts, fried or fatty foods, dairy, alcohol and spicy foods until your symptoms go away.  Follow-up care  Follow up with your healthcare provider, or as advised, if your pain does not begin to improve in the next 24 hours.  Call 911  Call 911 if any of these occur:  · Trouble breathing  · Confusion  · Fainting or loss of consciousness  · Rapid heart rate  · Seizure  When to seek medical advice  Call your healthcare provider right away if any of these occur:  · Pain gets worse or moves to the right lower abdomen  · New or worsening vomiting or diarrhea  · Swelling of the abdomen  · Unable to pass stool for more than 3 days  · Fever of 100.4ºF (38ºC) or higher, or as directed by your healthcare provider.  · Blood in vomit or bowel movements (dark red or black color)  · Jaundice (yellow color of eyes and skin)  · Weakness, dizziness  · Chest, arm, back, neck or jaw pain  · Unexpected vaginal bleeding or missed period  · Can't keep down liquids or water and are getting dehydrated  Date Last Reviewed: 12/30/2015  © 4374-1088 [employer]. [address] [place] [address], [place] [address]. All rights reserved. This information is not intended as a substitute for professional medical care. Always follow your healthcare professional's instructions.

## 2019-04-22 ENCOUNTER — OFFICE VISIT (OUTPATIENT)
Dept: OBSTETRICS AND GYNECOLOGY | Facility: CLINIC | Age: 39
End: 2019-04-22
Payer: COMMERCIAL

## 2019-04-22 VITALS
SYSTOLIC BLOOD PRESSURE: 110 MMHG | DIASTOLIC BLOOD PRESSURE: 70 MMHG | BODY MASS INDEX: 21.46 KG/M2 | WEIGHT: 125.69 LBS | HEIGHT: 64 IN

## 2019-04-22 DIAGNOSIS — B37.31 VAGINAL YEAST INFECTION: ICD-10-CM

## 2019-04-22 DIAGNOSIS — Z11.3 SCREEN FOR STD (SEXUALLY TRANSMITTED DISEASE): ICD-10-CM

## 2019-04-22 DIAGNOSIS — Z01.419 ENCOUNTER FOR GYNECOLOGICAL EXAMINATION: Primary | ICD-10-CM

## 2019-04-22 DIAGNOSIS — Z11.51 SCREENING FOR HUMAN PAPILLOMAVIRUS: ICD-10-CM

## 2019-04-22 DIAGNOSIS — Z12.4 SCREENING FOR MALIGNANT NEOPLASM OF CERVIX: ICD-10-CM

## 2019-04-22 PROCEDURE — 99395 PREV VISIT EST AGE 18-39: CPT | Mod: S$GLB,,, | Performed by: OBSTETRICS & GYNECOLOGY

## 2019-04-22 PROCEDURE — 99999 PR PBB SHADOW E&M-EST. PATIENT-LVL II: CPT | Mod: PBBFAC,,, | Performed by: OBSTETRICS & GYNECOLOGY

## 2019-04-22 PROCEDURE — 99999 PR PBB SHADOW E&M-EST. PATIENT-LVL II: ICD-10-PCS | Mod: PBBFAC,,, | Performed by: OBSTETRICS & GYNECOLOGY

## 2019-04-22 PROCEDURE — 88175 CYTOPATH C/V AUTO FLUID REDO: CPT

## 2019-04-22 PROCEDURE — 87491 CHLMYD TRACH DNA AMP PROBE: CPT

## 2019-04-22 PROCEDURE — 99395 PR PREVENTIVE VISIT,EST,18-39: ICD-10-PCS | Mod: S$GLB,,, | Performed by: OBSTETRICS & GYNECOLOGY

## 2019-04-22 PROCEDURE — 87624 HPV HI-RISK TYP POOLED RSLT: CPT

## 2019-04-22 NOTE — PROGRESS NOTES
Chief Complaint: Well Woman Exam, Secondary Infertility     HPI:      Frida Benton is a 38 y.o.  who presents for annual exam. She is currently complaining of secondary infertility. She and her  have been trying for >1 year to conceive without success. Pt has regular menses and has been doing OPKs which have been positive. Having intercourse almost daily during fertile window.  is a heavy marijuana user.    Pt is concerned she may have recurrent yeast infections. Occasionally has vaginal pruritis and a cottage cheese like discharge that she treats with Monistat. Recently  has been having recurrent tearing of the skin of his penis during intercourse. He was treated with oral diflucan but symptoms persisted. He is currently under the care of a urologist. This has only slightly decreased frequency of intercourse.    She would like STD screening today. Patient has regular monthly menses. Patient's last menstrual period was 2019 (exact date).     Previous Pap:  NILM, HPV non-16/18 positive (2018)    Ms. Benton confirms that she wears her seatbelt when riding in the car and does not text while driving.     OB History        1    Para   1    Term   1            AB        Living   1       SAB        TAB        Ectopic        Multiple        Live Births   1           Obstetric Comments   Menarche ~ 14             ROS:     GENERAL: Denies unintentional weight gain or weight loss. Feeling well overall.   SKIN: Denies rash or lesions.   HEENT: Denies headaches, or vision changes.   CARDIOVASCULAR: Denies palpitations or chest pain.   RESPIRATORY: Denies shortness of breath or dyspnea on exertion.  BREASTS: Denies pain, lumps, or nipple discharge.   ABDOMEN: Denies abdominal pain, constipation, diarrhea, nausea, vomiting, or change in appetite.   URINARY: Denies frequency, dysuria, hematuria.  NEUROLOGIC: Denies syncope or weakness.   PSYCHIATRIC: Denies depression, anxiety  "or mood swings.    Physical Exam:      PHYSICAL EXAM:  /70   Ht 5' 4" (1.626 m)   Wt 57 kg (125 lb 10.6 oz)   LMP 04/16/2019 (Exact Date)   BMI 21.57 kg/m²   Body mass index is 21.57 kg/m².     APPEARANCE: Well nourished, well developed, in no acute distress.  PSYCH: Appropriate mood and affect.  SKIN: No acne or hirsutism  NECK: Neck symmetric without masses or thyromegaly  NODES: No inguinal, axillary, or supraclavicular lymph node enlargement  CHEST: Normal respiratory effort.  ABDOMEN: Soft.  No tenderness or masses.   BREASTS: Symmetrical, no skin changes or visible lesions.  No palpable masses or nipple discharge bilaterally.  PELVIC: Normal external genitalia without lesions.  Normal hair distribution.  Adequate perineal body, normal urethral meatus.  Vagina moist and well rugated without lesions or discharge.  Cervix pink, without lesions, discharge or tenderness.  No significant cystocele or rectocele.  Bimanual exam shows uterus to be normal size, regular, mobile and nontender.  Adnexa without masses or tenderness.    EXTREMITIES: No edema.    Wet prep/KOH:     Negative for yeast, clue cells, and trichomonads    Assessment/Plan:     Encounter for gynecological examination    Screening for malignant neoplasm of cervix  -     Liquid-based pap smear, screening    Screening for human papillomavirus  -     HPV High Risk Genotypes, PCR    Vaginal yeast infection  -     POCT WET PREP  -     POCT KOH    Screen for STD (sexually transmitted disease)  -     C. trachomatis/N. gonorrhoeae by AMP DNA      Counseling:     Patient was counseled today on current ASCCP pap guidelines, the recommendation for yearly pelvic exams, healthy diet and exercise routines, breast self awareness. She is to see her PCP for other health maintenance.     Pre-Conception Counseling  Patient was counseled today on proper weight gain during pregnancy based on the [facility]'s recommendations. Healthy diet emphasized. "   Recommended prenatal vitamins with folic acid starting now.  Safety of exercise discussed with patient, and continued active lifestyle encouraged.   Avoidance of tobacco and alcohol when trying to conceive were discussed.   Optimal timing of intercourse reviewed.     Pt instructed to have  complete a semen analysis. If normal next step would be an HSG. This was discussed with pt. Offered [facility]HAEL referral due to AMA and infertility, but patient declines at this time.    Use of the [employer] Patient Portal discussed and encouraged during today's visit.

## 2019-04-24 LAB
C TRACH DNA SPEC QL NAA+PROBE: NOT DETECTED
N GONORRHOEA DNA SPEC QL NAA+PROBE: NOT DETECTED

## 2019-04-29 LAB
HPV HR 12 DNA CVX QL NAA+PROBE: NEGATIVE
HPV16 AG SPEC QL: NEGATIVE
HPV18 DNA SPEC QL NAA+PROBE: NEGATIVE

## 2019-05-01 ENCOUNTER — PATIENT MESSAGE (OUTPATIENT)
Dept: OBSTETRICS AND GYNECOLOGY | Facility: CLINIC | Age: 39
End: 2019-05-01

## 2019-12-26 ENCOUNTER — PATIENT MESSAGE (OUTPATIENT)
Dept: OBSTETRICS AND GYNECOLOGY | Facility: CLINIC | Age: 39
End: 2019-12-26

## 2019-12-26 ENCOUNTER — NURSE TRIAGE (OUTPATIENT)
Dept: ADMINISTRATIVE | Facility: CLINIC | Age: 39
End: 2019-12-26

## 2019-12-26 NOTE — TELEPHONE ENCOUNTER
Painful urination started this evening. Care advice discussed. Understanding verbalized. Patient states she is out of town right now. Advised patient to go to . Please contact caller directly to discuss any further care advice.    Reason for Disposition   All other patients with painful urination(Exception: [1] EITHER frequency or urgency AND [2] has on-call doctor)    Additional Information   Negative: Shock suspected (e.g., cold/pale/clammy skin, too weak to stand, low BP, rapid pulse)   Negative: Sounds like a life-threatening emergency to the triager   Negative: Followed a genital area injury   Negative: Taking antibiotic for urinary tract infection (UTI)   Negative: Pregnant   Negative: Postpartum < 1 month   Negative: [1] Unable to urinate (or only a few drops) > 4 hours AND [2] bladder feels very full (e.g., palpable bladder or strong urge to urinate)   Negative: Vomiting   Negative: Patient sounds very sick or weak to the triager   Negative: [1] SEVERE pain with urination  (e.g., excruciating) AND [2] not improved after 2 hours of pain medicine and Sitz bath   Negative: Fever > 100.5 F (38.1 C)   Negative: Side (flank) or lower back pain present   Negative: Diabetes mellitus or weak immune system (e.g., HIV positive, cancer chemotherapy, transplant patient)   Negative: Bedridden (e.g., nursing home patient, CVA, chronic illness, recovering from surgery)   Negative: Artificial heart valve or artificial joint   Negative: > 2 UTI's in last year   Negative: Age > 50 years   Negative: Blood in urine (red, pink, or tea-colored)   Negative: Possibility of pregnancy   Negative: Patient is worried about sexually transmitted disease (STD)   Negative: Unusual vaginal discharge (e.g., bad smelling, yellow, green, or foamy-white)    Protocols used: URINATION PAIN - FEMALE-A-

## 2019-12-27 RX ORDER — NITROFURANTOIN 25; 75 MG/1; MG/1
100 CAPSULE ORAL 2 TIMES DAILY
Qty: 6 CAPSULE | Refills: 0 | Status: SHIPPED | OUTPATIENT
Start: 2019-12-27 | End: 2019-12-30

## 2020-07-29 ENCOUNTER — PATIENT MESSAGE (OUTPATIENT)
Dept: OBSTETRICS AND GYNECOLOGY | Facility: CLINIC | Age: 40
End: 2020-07-29

## 2020-07-31 RX ORDER — SERTRALINE HYDROCHLORIDE 25 MG/1
25 TABLET, FILM COATED ORAL DAILY
Qty: 30 TABLET | Refills: 2 | Status: SHIPPED | OUTPATIENT
Start: 2020-07-31 | End: 2020-11-27

## 2020-11-27 RX ORDER — SERTRALINE HYDROCHLORIDE 25 MG/1
TABLET, FILM COATED ORAL
Qty: 30 TABLET | Refills: 0 | Status: SHIPPED | OUTPATIENT
Start: 2020-11-27 | End: 2021-01-04

## 2020-11-27 NOTE — TELEPHONE ENCOUNTER
Called pt on 11/27 @ 908am per Dr. Tejada     Pt did not answer. Left VM stating 3 months of Rx has been sent to preferred pharmacy but no more refills can be sent in until pt schedules annual. Requested pt to call office at [phone] to schedule annual

## 2021-01-04 RX ORDER — SERTRALINE HYDROCHLORIDE 25 MG/1
TABLET, FILM COATED ORAL
Qty: 90 TABLET | Refills: 0 | Status: SHIPPED | OUTPATIENT
Start: 2021-01-04 | End: 2021-07-14

## 2021-01-29 ENCOUNTER — TELEPHONE (OUTPATIENT)
Dept: OBSTETRICS AND GYNECOLOGY | Facility: CLINIC | Age: 41
End: 2021-01-29

## 2021-01-29 ENCOUNTER — TELEPHONE (OUTPATIENT)
Dept: MATERNAL FETAL MEDICINE | Facility: CLINIC | Age: 41
End: 2021-01-29

## 2021-01-29 DIAGNOSIS — O30.002 TWIN GESTATION IN SECOND TRIMESTER, UNSPECIFIED MULTIPLE GESTATION TYPE: Primary | ICD-10-CM

## 2021-02-02 ENCOUNTER — PATIENT MESSAGE (OUTPATIENT)
Dept: OBSTETRICS AND GYNECOLOGY | Facility: CLINIC | Age: 41
End: 2021-02-02

## 2021-02-04 ENCOUNTER — OFFICE VISIT (OUTPATIENT)
Dept: MATERNAL FETAL MEDICINE | Facility: CLINIC | Age: 41
End: 2021-02-04
Payer: COMMERCIAL

## 2021-02-04 ENCOUNTER — PROCEDURE VISIT (OUTPATIENT)
Dept: MATERNAL FETAL MEDICINE | Facility: CLINIC | Age: 41
End: 2021-02-04
Payer: COMMERCIAL

## 2021-02-04 DIAGNOSIS — O30.032 MONOCHORIONIC DIAMNIOTIC TWIN PREGNANCY IN SECOND TRIMESTER: Primary | ICD-10-CM

## 2021-02-04 DIAGNOSIS — F32.A ANXIETY AND DEPRESSION: ICD-10-CM

## 2021-02-04 DIAGNOSIS — O35.9XX1 KNOWN FETAL ANOMALY, ANTEPARTUM, FETUS 1: ICD-10-CM

## 2021-02-04 DIAGNOSIS — O35.9XX2: ICD-10-CM

## 2021-02-04 DIAGNOSIS — O30.032 MONOCHORIONIC DIAMNIOTIC TWIN GESTATION IN SECOND TRIMESTER: ICD-10-CM

## 2021-02-04 DIAGNOSIS — O09.522 ADVANCED MATERNAL AGE IN MULTIGRAVIDA, SECOND TRIMESTER: ICD-10-CM

## 2021-02-04 DIAGNOSIS — O30.002 TWIN GESTATION IN SECOND TRIMESTER, UNSPECIFIED MULTIPLE GESTATION TYPE: ICD-10-CM

## 2021-02-04 DIAGNOSIS — F41.9 ANXIETY AND DEPRESSION: ICD-10-CM

## 2021-02-04 PROCEDURE — 76811 PR US, OB FETAL EVAL & EXAM, TRANSABDOM,FIRST GESTATION: ICD-10-PCS | Mod: S$GLB,,, | Performed by: OBSTETRICS & GYNECOLOGY

## 2021-02-04 PROCEDURE — 99999 PR PBB SHADOW E&M-EST. PATIENT-LVL I: ICD-10-PCS | Mod: PBBFAC,,, | Performed by: OBSTETRICS & GYNECOLOGY

## 2021-02-04 PROCEDURE — 99999 PR PBB SHADOW E&M-EST. PATIENT-LVL I: CPT | Mod: PBBFAC,,, | Performed by: OBSTETRICS & GYNECOLOGY

## 2021-02-04 PROCEDURE — 76812 OB US DETAILED ADDL FETUS: ICD-10-PCS | Mod: S$GLB,,, | Performed by: OBSTETRICS & GYNECOLOGY

## 2021-02-04 PROCEDURE — 76811 OB US DETAILED SNGL FETUS: CPT | Mod: S$GLB,,, | Performed by: OBSTETRICS & GYNECOLOGY

## 2021-02-04 PROCEDURE — 99205 OFFICE O/P NEW HI 60 MIN: CPT | Mod: 25,S$GLB,, | Performed by: OBSTETRICS & GYNECOLOGY

## 2021-02-04 PROCEDURE — 99205 PR OFFICE/OUTPT VISIT, NEW, LEVL V, 60-74 MIN: ICD-10-PCS | Mod: 25,S$GLB,, | Performed by: OBSTETRICS & GYNECOLOGY

## 2021-02-04 PROCEDURE — 76812 OB US DETAILED ADDL FETUS: CPT | Mod: S$GLB,,, | Performed by: OBSTETRICS & GYNECOLOGY

## 2021-02-05 ENCOUNTER — TELEPHONE (OUTPATIENT)
Dept: OBSTETRICS AND GYNECOLOGY | Facility: CLINIC | Age: 41
End: 2021-02-05

## 2021-02-10 ENCOUNTER — INITIAL PRENATAL (OUTPATIENT)
Dept: OBSTETRICS AND GYNECOLOGY | Facility: CLINIC | Age: 41
End: 2021-02-10
Payer: COMMERCIAL

## 2021-02-10 ENCOUNTER — LAB VISIT (OUTPATIENT)
Dept: LAB | Facility: HOSPITAL | Age: 41
End: 2021-02-10
Attending: OBSTETRICS & GYNECOLOGY
Payer: COMMERCIAL

## 2021-02-10 VITALS
WEIGHT: 158.81 LBS | SYSTOLIC BLOOD PRESSURE: 124 MMHG | BODY MASS INDEX: 31.02 KG/M2 | DIASTOLIC BLOOD PRESSURE: 84 MMHG

## 2021-02-10 DIAGNOSIS — O30.032 MONOCHORIONIC DIAMNIOTIC TWIN GESTATION IN SECOND TRIMESTER: Primary | ICD-10-CM

## 2021-02-10 DIAGNOSIS — O26.892 HEARTBURN DURING PREGNANCY IN SECOND TRIMESTER: ICD-10-CM

## 2021-02-10 DIAGNOSIS — O35.9XX1 KNOWN FETAL ANOMALY, ANTEPARTUM, FETUS 1: ICD-10-CM

## 2021-02-10 DIAGNOSIS — O35.9XX2: ICD-10-CM

## 2021-02-10 DIAGNOSIS — O09.522 ADVANCED MATERNAL AGE IN MULTIGRAVIDA, SECOND TRIMESTER: ICD-10-CM

## 2021-02-10 DIAGNOSIS — F41.9 ANXIETY AND DEPRESSION: ICD-10-CM

## 2021-02-10 DIAGNOSIS — R12 HEARTBURN DURING PREGNANCY IN SECOND TRIMESTER: ICD-10-CM

## 2021-02-10 DIAGNOSIS — F32.A ANXIETY AND DEPRESSION: ICD-10-CM

## 2021-02-10 DIAGNOSIS — O30.032 MONOCHORIONIC DIAMNIOTIC TWIN GESTATION IN SECOND TRIMESTER: ICD-10-CM

## 2021-02-10 LAB
BASOPHILS # BLD AUTO: 0.03 K/UL (ref 0–0.2)
BASOPHILS NFR BLD: 0.4 % (ref 0–1.9)
DIFFERENTIAL METHOD: ABNORMAL
EOSINOPHIL # BLD AUTO: 0.2 K/UL (ref 0–0.5)
EOSINOPHIL NFR BLD: 1.8 % (ref 0–8)
ERYTHROCYTE [DISTWIDTH] IN BLOOD BY AUTOMATED COUNT: 13.2 % (ref 11.5–14.5)
HCT VFR BLD AUTO: 32.4 % (ref 37–48.5)
HGB BLD-MCNC: 10.9 G/DL (ref 12–16)
IMM GRANULOCYTES # BLD AUTO: 0.07 K/UL (ref 0–0.04)
IMM GRANULOCYTES NFR BLD AUTO: 0.9 % (ref 0–0.5)
LYMPHOCYTES # BLD AUTO: 1.8 K/UL (ref 1–4.8)
LYMPHOCYTES NFR BLD: 22.1 % (ref 18–48)
MCH RBC QN AUTO: 32.1 PG (ref 27–31)
MCHC RBC AUTO-ENTMCNC: 33.6 G/DL (ref 32–36)
MCV RBC AUTO: 95 FL (ref 82–98)
MONOCYTES # BLD AUTO: 0.8 K/UL (ref 0.3–1)
MONOCYTES NFR BLD: 9.3 % (ref 4–15)
NEUTROPHILS # BLD AUTO: 5.4 K/UL (ref 1.8–7.7)
NEUTROPHILS NFR BLD: 65.5 % (ref 38–73)
NRBC BLD-RTO: 0 /100 WBC
PLATELET # BLD AUTO: 274 K/UL (ref 150–350)
PMV BLD AUTO: 9.3 FL (ref 9.2–12.9)
RBC # BLD AUTO: 3.4 M/UL (ref 4–5.4)
WBC # BLD AUTO: 8.19 K/UL (ref 3.9–12.7)

## 2021-02-10 PROCEDURE — 86703 HIV-1/HIV-2 1 RESULT ANTBDY: CPT

## 2021-02-10 PROCEDURE — 80048 BASIC METABOLIC PNL TOTAL CA: CPT

## 2021-02-10 PROCEDURE — 86762 RUBELLA ANTIBODY: CPT

## 2021-02-10 PROCEDURE — 85025 COMPLETE CBC W/AUTO DIFF WBC: CPT

## 2021-02-10 PROCEDURE — 36415 COLL VENOUS BLD VENIPUNCTURE: CPT | Mod: PN

## 2021-02-10 PROCEDURE — 0500F INITIAL PRENATAL CARE VISIT: CPT | Mod: S$GLB,,, | Performed by: OBSTETRICS & GYNECOLOGY

## 2021-02-10 PROCEDURE — 87340 HEPATITIS B SURFACE AG IA: CPT

## 2021-02-10 PROCEDURE — 0500F PR INITIAL PRENATAL CARE VISIT: ICD-10-PCS | Mod: S$GLB,,, | Performed by: OBSTETRICS & GYNECOLOGY

## 2021-02-10 PROCEDURE — 86900 BLOOD TYPING SEROLOGIC ABO: CPT

## 2021-02-10 PROCEDURE — 99999 PR PBB SHADOW E&M-EST. PATIENT-LVL II: CPT | Mod: PBBFAC,,, | Performed by: OBSTETRICS & GYNECOLOGY

## 2021-02-10 PROCEDURE — 86592 SYPHILIS TEST NON-TREP QUAL: CPT

## 2021-02-10 PROCEDURE — 99999 PR PBB SHADOW E&M-EST. PATIENT-LVL II: ICD-10-PCS | Mod: PBBFAC,,, | Performed by: OBSTETRICS & GYNECOLOGY

## 2021-02-10 RX ORDER — FAMOTIDINE 10 MG/1
10 TABLET ORAL 2 TIMES DAILY
COMMUNITY

## 2021-02-10 RX ORDER — DOCUSATE SODIUM 100 MG/1
100 CAPSULE, LIQUID FILLED ORAL 2 TIMES DAILY
COMMUNITY
End: 2021-04-14

## 2021-02-10 RX ORDER — ZINC GLUCONATE 13.3 MG
200 LOZENGE ORAL 4 TIMES DAILY
COMMUNITY
End: 2021-04-14

## 2021-02-10 RX ORDER — CALCIUM CARBONATE 200(500)MG
1 TABLET,CHEWABLE ORAL DAILY
COMMUNITY

## 2021-02-10 RX ORDER — PRENATAL WITH FERROUS FUM AND FOLIC ACID 3080; 920; 120; 400; 22; 1.84; 3; 20; 10; 1; 12; 200; 27; 25; 2 [IU]/1; [IU]/1; MG/1; [IU]/1; MG/1; MG/1; MG/1; MG/1; MG/1; MG/1; UG/1; MG/1; MG/1; MG/1; MG/1
1 TABLET ORAL DAILY
COMMUNITY
Start: 2021-01-06

## 2021-02-10 RX ORDER — ASPIRIN 81 MG/1
81 TABLET ORAL DAILY
Status: ON HOLD | COMMUNITY
End: 2021-06-15 | Stop reason: HOSPADM

## 2021-02-10 RX ORDER — ESOMEPRAZOLE MAGNESIUM 40 MG/1
40 CAPSULE, DELAYED RELEASE ORAL DAILY
Qty: 30 CAPSULE | Refills: 1 | Status: SHIPPED | OUTPATIENT
Start: 2021-02-10 | End: 2021-04-08

## 2021-02-11 ENCOUNTER — PATIENT MESSAGE (OUTPATIENT)
Dept: OBSTETRICS AND GYNECOLOGY | Facility: CLINIC | Age: 41
End: 2021-02-11

## 2021-02-11 LAB
ABO + RH BLD: NORMAL
ANION GAP SERPL CALC-SCNC: 11 MMOL/L (ref 8–16)
BLD GP AB SCN CELLS X3 SERPL QL: NORMAL
BUN SERPL-MCNC: 7 MG/DL (ref 6–20)
CALCIUM SERPL-MCNC: 9.1 MG/DL (ref 8.7–10.5)
CHLORIDE SERPL-SCNC: 104 MMOL/L (ref 95–110)
CO2 SERPL-SCNC: 22 MMOL/L (ref 23–29)
CREAT SERPL-MCNC: 0.6 MG/DL (ref 0.5–1.4)
EST. GFR  (AFRICAN AMERICAN): >60 ML/MIN/1.73 M^2
EST. GFR  (NON AFRICAN AMERICAN): >60 ML/MIN/1.73 M^2
GLUCOSE SERPL-MCNC: 79 MG/DL (ref 70–110)
HBV SURFACE AG SERPL QL IA: NEGATIVE
HIV 1+2 AB+HIV1 P24 AG SERPL QL IA: NEGATIVE
POTASSIUM SERPL-SCNC: 3.6 MMOL/L (ref 3.5–5.1)
RPR SER QL: NORMAL
RUBV IGG SER-ACNC: 24.1 IU/ML
RUBV IGG SER-IMP: REACTIVE
SODIUM SERPL-SCNC: 137 MMOL/L (ref 136–145)

## 2021-02-18 ENCOUNTER — PATIENT MESSAGE (OUTPATIENT)
Dept: OBSTETRICS AND GYNECOLOGY | Facility: CLINIC | Age: 41
End: 2021-02-18

## 2021-02-19 ENCOUNTER — PATIENT MESSAGE (OUTPATIENT)
Dept: MATERNAL FETAL MEDICINE | Facility: CLINIC | Age: 41
End: 2021-02-19

## 2021-02-24 ENCOUNTER — IMMUNIZATION (OUTPATIENT)
Dept: FAMILY MEDICINE | Facility: CLINIC | Age: 41
End: 2021-02-24
Payer: COMMERCIAL

## 2021-02-24 DIAGNOSIS — Z23 NEED FOR VACCINATION: Primary | ICD-10-CM

## 2021-02-24 PROCEDURE — 91300 COVID-19, MRNA, LNP-S, PF, 30 MCG/0.3 ML DOSE VACCINE: CPT | Mod: PBBFAC | Performed by: INTERNAL MEDICINE

## 2021-03-03 ENCOUNTER — OFFICE VISIT (OUTPATIENT)
Dept: MATERNAL FETAL MEDICINE | Facility: CLINIC | Age: 41
End: 2021-03-03
Payer: COMMERCIAL

## 2021-03-03 ENCOUNTER — OFFICE VISIT (OUTPATIENT)
Dept: PEDIATRIC CARDIOLOGY | Facility: CLINIC | Age: 41
End: 2021-03-03
Payer: COMMERCIAL

## 2021-03-03 ENCOUNTER — CLINICAL SUPPORT (OUTPATIENT)
Dept: PEDIATRIC CARDIOLOGY | Facility: CLINIC | Age: 41
End: 2021-03-03
Attending: OBSTETRICS & GYNECOLOGY
Payer: COMMERCIAL

## 2021-03-03 ENCOUNTER — PROCEDURE VISIT (OUTPATIENT)
Dept: MATERNAL FETAL MEDICINE | Facility: CLINIC | Age: 41
End: 2021-03-03
Payer: COMMERCIAL

## 2021-03-03 VITALS
DIASTOLIC BLOOD PRESSURE: 79 MMHG | SYSTOLIC BLOOD PRESSURE: 125 MMHG | WEIGHT: 164.69 LBS | BODY MASS INDEX: 27.4 KG/M2 | HEART RATE: 130 BPM

## 2021-03-03 VITALS
BODY MASS INDEX: 27.32 KG/M2 | SYSTOLIC BLOOD PRESSURE: 124 MMHG | WEIGHT: 164 LBS | DIASTOLIC BLOOD PRESSURE: 79 MMHG | HEIGHT: 65 IN

## 2021-03-03 DIAGNOSIS — O35.9XX2: ICD-10-CM

## 2021-03-03 DIAGNOSIS — O30.032 MONOCHORIONIC DIAMNIOTIC TWIN PREGNANCY IN SECOND TRIMESTER: ICD-10-CM

## 2021-03-03 DIAGNOSIS — O30.032 MONOCHORIONIC DIAMNIOTIC TWIN GESTATION IN SECOND TRIMESTER: Primary | ICD-10-CM

## 2021-03-03 DIAGNOSIS — O30.002 TWIN GESTATION IN SECOND TRIMESTER, UNSPECIFIED MULTIPLE GESTATION TYPE: ICD-10-CM

## 2021-03-03 DIAGNOSIS — O30.032 MONOCHORIONIC DIAMNIOTIC TWIN GESTATION IN SECOND TRIMESTER: ICD-10-CM

## 2021-03-03 DIAGNOSIS — O35.9XX1 KNOWN FETAL ANOMALY, ANTEPARTUM, FETUS 1: Primary | ICD-10-CM

## 2021-03-03 PROCEDURE — 99999 PR PBB SHADOW E&M-EST. PATIENT-LVL III: CPT | Mod: PBBFAC,,, | Performed by: PEDIATRICS

## 2021-03-03 PROCEDURE — 99203 OFFICE O/P NEW LOW 30 MIN: CPT | Mod: 25,S$GLB,, | Performed by: PEDIATRICS

## 2021-03-03 PROCEDURE — 76825 ECHO EXAM OF FETAL HEART: CPT | Mod: S$GLB,,, | Performed by: PEDIATRICS

## 2021-03-03 PROCEDURE — 3008F BODY MASS INDEX DOCD: CPT | Mod: CPTII,S$GLB,, | Performed by: OBSTETRICS & GYNECOLOGY

## 2021-03-03 PROCEDURE — 76815 PR  US,PREGNANT UTERUS,LIMITED, 1/> FETUSES: ICD-10-PCS | Mod: S$GLB,,, | Performed by: OBSTETRICS & GYNECOLOGY

## 2021-03-03 PROCEDURE — 93325 DOPPLER ECHO COLOR FLOW MAPG: CPT | Mod: S$GLB,,, | Performed by: PEDIATRICS

## 2021-03-03 PROCEDURE — 99999 PR PBB SHADOW E&M-EST. PATIENT-LVL III: ICD-10-PCS | Mod: PBBFAC,,, | Performed by: OBSTETRICS & GYNECOLOGY

## 2021-03-03 PROCEDURE — 76825 PR  SO2 FETAL HEART: ICD-10-PCS | Mod: 59,S$GLB,, | Performed by: PEDIATRICS

## 2021-03-03 PROCEDURE — 99999 PR PBB SHADOW E&M-EST. PATIENT-LVL III: CPT | Mod: PBBFAC,,, | Performed by: OBSTETRICS & GYNECOLOGY

## 2021-03-03 PROCEDURE — 99214 OFFICE O/P EST MOD 30 MIN: CPT | Mod: 25,S$GLB,, | Performed by: OBSTETRICS & GYNECOLOGY

## 2021-03-03 PROCEDURE — 1126F AMNT PAIN NOTED NONE PRSNT: CPT | Mod: S$GLB,,, | Performed by: OBSTETRICS & GYNECOLOGY

## 2021-03-03 PROCEDURE — 99999 PR PBB SHADOW E&M-EST. PATIENT-LVL III: ICD-10-PCS | Mod: PBBFAC,,, | Performed by: PEDIATRICS

## 2021-03-03 PROCEDURE — 93325 DOPPLER ECHO COLOR FLOW MAPG: CPT | Mod: 59,S$GLB,, | Performed by: PEDIATRICS

## 2021-03-03 PROCEDURE — 93325 PR DOPPLER COLOR FLOW VELOCITY MAP: ICD-10-PCS | Mod: 59,S$GLB,, | Performed by: PEDIATRICS

## 2021-03-03 PROCEDURE — 76815 OB US LIMITED FETUS(S): CPT | Mod: S$GLB,,, | Performed by: OBSTETRICS & GYNECOLOGY

## 2021-03-03 PROCEDURE — 1126F PR PAIN SEVERITY QUANTIFIED, NO PAIN PRESENT: ICD-10-PCS | Mod: S$GLB,,, | Performed by: OBSTETRICS & GYNECOLOGY

## 2021-03-03 PROCEDURE — 76825 ECHO EXAM OF FETAL HEART: CPT | Mod: 59,S$GLB,, | Performed by: PEDIATRICS

## 2021-03-03 PROCEDURE — 3008F PR BODY MASS INDEX (BMI) DOCUMENTED: ICD-10-PCS | Mod: CPTII,S$GLB,, | Performed by: OBSTETRICS & GYNECOLOGY

## 2021-03-03 PROCEDURE — 3008F PR BODY MASS INDEX (BMI) DOCUMENTED: ICD-10-PCS | Mod: CPTII,S$GLB,, | Performed by: PEDIATRICS

## 2021-03-03 PROCEDURE — 76827 ECHO EXAM OF FETAL HEART: CPT | Mod: S$GLB,,, | Performed by: PEDIATRICS

## 2021-03-03 PROCEDURE — 1126F AMNT PAIN NOTED NONE PRSNT: CPT | Mod: S$GLB,,, | Performed by: PEDIATRICS

## 2021-03-03 PROCEDURE — 76827 PR  SO2 FETAL HEART DOPPLER: ICD-10-PCS | Mod: 59,S$GLB,, | Performed by: PEDIATRICS

## 2021-03-03 PROCEDURE — 1126F PR PAIN SEVERITY QUANTIFIED, NO PAIN PRESENT: ICD-10-PCS | Mod: S$GLB,,, | Performed by: PEDIATRICS

## 2021-03-03 PROCEDURE — 76825 PR  SO2 FETAL HEART: ICD-10-PCS | Mod: S$GLB,,, | Performed by: PEDIATRICS

## 2021-03-03 PROCEDURE — 99203 PR OFFICE/OUTPT VISIT, NEW, LEVL III, 30-44 MIN: ICD-10-PCS | Mod: 25,S$GLB,, | Performed by: PEDIATRICS

## 2021-03-03 PROCEDURE — 99214 PR OFFICE/OUTPT VISIT, EST, LEVL IV, 30-39 MIN: ICD-10-PCS | Mod: 25,S$GLB,, | Performed by: OBSTETRICS & GYNECOLOGY

## 2021-03-03 PROCEDURE — 76827 ECHO EXAM OF FETAL HEART: CPT | Mod: 59,S$GLB,, | Performed by: PEDIATRICS

## 2021-03-03 PROCEDURE — 3008F BODY MASS INDEX DOCD: CPT | Mod: CPTII,S$GLB,, | Performed by: PEDIATRICS

## 2021-03-03 PROCEDURE — 93325 PR DOPPLER COLOR FLOW VELOCITY MAP: ICD-10-PCS | Mod: S$GLB,,, | Performed by: PEDIATRICS

## 2021-03-03 PROCEDURE — 76827 PR  SO2 FETAL HEART DOPPLER: ICD-10-PCS | Mod: S$GLB,,, | Performed by: PEDIATRICS

## 2021-03-11 ENCOUNTER — PATIENT MESSAGE (OUTPATIENT)
Dept: OBSTETRICS AND GYNECOLOGY | Facility: CLINIC | Age: 41
End: 2021-03-11

## 2021-03-17 ENCOUNTER — ROUTINE PRENATAL (OUTPATIENT)
Dept: OBSTETRICS AND GYNECOLOGY | Facility: CLINIC | Age: 41
End: 2021-03-17
Payer: COMMERCIAL

## 2021-03-17 ENCOUNTER — PROCEDURE VISIT (OUTPATIENT)
Dept: MATERNAL FETAL MEDICINE | Facility: CLINIC | Age: 41
End: 2021-03-17
Payer: COMMERCIAL

## 2021-03-17 ENCOUNTER — LAB VISIT (OUTPATIENT)
Dept: LAB | Facility: OTHER | Age: 41
End: 2021-03-17
Attending: OBSTETRICS & GYNECOLOGY
Payer: COMMERCIAL

## 2021-03-17 VITALS
DIASTOLIC BLOOD PRESSURE: 70 MMHG | BODY MASS INDEX: 27.92 KG/M2 | SYSTOLIC BLOOD PRESSURE: 122 MMHG | WEIGHT: 167.75 LBS

## 2021-03-17 DIAGNOSIS — O09.92 PREGNANCY, SUPERVISION, HIGH-RISK, SECOND TRIMESTER: ICD-10-CM

## 2021-03-17 DIAGNOSIS — O30.032 MONOCHORIONIC DIAMNIOTIC TWIN GESTATION IN SECOND TRIMESTER: ICD-10-CM

## 2021-03-17 DIAGNOSIS — O30.002 TWIN GESTATION IN SECOND TRIMESTER, UNSPECIFIED MULTIPLE GESTATION TYPE: ICD-10-CM

## 2021-03-17 DIAGNOSIS — O30.032 MONOCHORIONIC DIAMNIOTIC TWIN GESTATION IN SECOND TRIMESTER: Primary | ICD-10-CM

## 2021-03-17 LAB
BASOPHILS # BLD AUTO: 0.02 K/UL (ref 0–0.2)
BASOPHILS NFR BLD: 0.3 % (ref 0–1.9)
DIFFERENTIAL METHOD: ABNORMAL
EOSINOPHIL # BLD AUTO: 0.1 K/UL (ref 0–0.5)
EOSINOPHIL NFR BLD: 1.1 % (ref 0–8)
ERYTHROCYTE [DISTWIDTH] IN BLOOD BY AUTOMATED COUNT: 12.6 % (ref 11.5–14.5)
GLUCOSE SERPL-MCNC: 168 MG/DL (ref 70–140)
HCT VFR BLD AUTO: 28.3 % (ref 37–48.5)
HGB BLD-MCNC: 9 G/DL (ref 12–16)
IMM GRANULOCYTES # BLD AUTO: 0.08 K/UL (ref 0–0.04)
IMM GRANULOCYTES NFR BLD AUTO: 1.1 % (ref 0–0.5)
LYMPHOCYTES # BLD AUTO: 1.4 K/UL (ref 1–4.8)
LYMPHOCYTES NFR BLD: 20.1 % (ref 18–48)
MCH RBC QN AUTO: 28.6 PG (ref 27–31)
MCHC RBC AUTO-ENTMCNC: 31.8 G/DL (ref 32–36)
MCV RBC AUTO: 90 FL (ref 82–98)
MONOCYTES # BLD AUTO: 0.6 K/UL (ref 0.3–1)
MONOCYTES NFR BLD: 8.1 % (ref 4–15)
NEUTROPHILS # BLD AUTO: 4.8 K/UL (ref 1.8–7.7)
NEUTROPHILS NFR BLD: 69.3 % (ref 38–73)
NRBC BLD-RTO: 0 /100 WBC
PLATELET # BLD AUTO: 286 K/UL (ref 150–350)
PMV BLD AUTO: 8.8 FL (ref 9.2–12.9)
RBC # BLD AUTO: 3.15 M/UL (ref 4–5.4)
WBC # BLD AUTO: 7 K/UL (ref 3.9–12.7)

## 2021-03-17 PROCEDURE — 99999 PR PBB SHADOW E&M-EST. PATIENT-LVL II: ICD-10-PCS | Mod: PBBFAC,,, | Performed by: OBSTETRICS & GYNECOLOGY

## 2021-03-17 PROCEDURE — 76816 OB US FOLLOW-UP PER FETUS: CPT | Mod: S$GLB,,, | Performed by: OBSTETRICS & GYNECOLOGY

## 2021-03-17 PROCEDURE — 36415 COLL VENOUS BLD VENIPUNCTURE: CPT | Performed by: OBSTETRICS & GYNECOLOGY

## 2021-03-17 PROCEDURE — 82950 GLUCOSE TEST: CPT | Performed by: OBSTETRICS & GYNECOLOGY

## 2021-03-17 PROCEDURE — 76816 PR  US,PREGNANT UTERUS,F/U,TRANSABD APP: ICD-10-PCS | Mod: S$GLB,,, | Performed by: OBSTETRICS & GYNECOLOGY

## 2021-03-17 PROCEDURE — 85025 COMPLETE CBC W/AUTO DIFF WBC: CPT | Performed by: OBSTETRICS & GYNECOLOGY

## 2021-03-17 PROCEDURE — 0502F SUBSEQUENT PRENATAL CARE: CPT | Mod: CPTII,S$GLB,, | Performed by: OBSTETRICS & GYNECOLOGY

## 2021-03-17 PROCEDURE — 0502F PR SUBSEQUENT PRENATAL CARE: ICD-10-PCS | Mod: CPTII,S$GLB,, | Performed by: OBSTETRICS & GYNECOLOGY

## 2021-03-17 PROCEDURE — 99999 PR PBB SHADOW E&M-EST. PATIENT-LVL II: CPT | Mod: PBBFAC,,, | Performed by: OBSTETRICS & GYNECOLOGY

## 2021-03-18 ENCOUNTER — IMMUNIZATION (OUTPATIENT)
Dept: FAMILY MEDICINE | Facility: CLINIC | Age: 41
End: 2021-03-18
Payer: COMMERCIAL

## 2021-03-18 ENCOUNTER — PATIENT MESSAGE (OUTPATIENT)
Dept: OBSTETRICS AND GYNECOLOGY | Facility: CLINIC | Age: 41
End: 2021-03-18

## 2021-03-18 DIAGNOSIS — O99.810 ABNORMAL GLUCOSE AFFECTING PREGNANCY: Primary | ICD-10-CM

## 2021-03-18 DIAGNOSIS — Z23 NEED FOR VACCINATION: Primary | ICD-10-CM

## 2021-03-18 PROCEDURE — 0002A COVID-19, MRNA, LNP-S, PF, 30 MCG/0.3 ML DOSE VACCINE: ICD-10-PCS | Mod: CV19,,, | Performed by: FAMILY MEDICINE

## 2021-03-18 PROCEDURE — 0002A COVID-19, MRNA, LNP-S, PF, 30 MCG/0.3 ML DOSE VACCINE: CPT | Mod: CV19,,, | Performed by: FAMILY MEDICINE

## 2021-03-18 PROCEDURE — 91300 COVID-19, MRNA, LNP-S, PF, 30 MCG/0.3 ML DOSE VACCINE: CPT | Mod: ,,, | Performed by: FAMILY MEDICINE

## 2021-03-18 PROCEDURE — 91300 COVID-19, MRNA, LNP-S, PF, 30 MCG/0.3 ML DOSE VACCINE: ICD-10-PCS | Mod: ,,, | Performed by: FAMILY MEDICINE

## 2021-03-22 ENCOUNTER — PATIENT MESSAGE (OUTPATIENT)
Dept: ADMINISTRATIVE | Facility: OTHER | Age: 41
End: 2021-03-22

## 2021-04-01 ENCOUNTER — PATIENT MESSAGE (OUTPATIENT)
Dept: OBSTETRICS AND GYNECOLOGY | Facility: CLINIC | Age: 41
End: 2021-04-01

## 2021-04-01 DIAGNOSIS — O24.419 GESTATIONAL DIABETES MELLITUS (GDM) IN SECOND TRIMESTER, GESTATIONAL DIABETES METHOD OF CONTROL UNSPECIFIED: Primary | ICD-10-CM

## 2021-04-06 ENCOUNTER — LAB VISIT (OUTPATIENT)
Dept: LAB | Facility: HOSPITAL | Age: 41
End: 2021-04-06
Attending: OBSTETRICS & GYNECOLOGY
Payer: COMMERCIAL

## 2021-04-06 DIAGNOSIS — O99.810 ABNORMAL GLUCOSE AFFECTING PREGNANCY: ICD-10-CM

## 2021-04-06 PROCEDURE — 82952 GTT-ADDED SAMPLES: CPT | Performed by: OBSTETRICS & GYNECOLOGY

## 2021-04-06 PROCEDURE — 36415 COLL VENOUS BLD VENIPUNCTURE: CPT | Mod: PO | Performed by: OBSTETRICS & GYNECOLOGY

## 2021-04-07 LAB
GLUCOSE SERPL-MCNC: 173 MG/DL
GLUCOSE SERPL-MCNC: 190 MG/DL
GLUCOSE SERPL-MCNC: 206 MG/DL
GLUCOSE SERPL-MCNC: 93 MG/DL (ref 70–110)

## 2021-04-08 RX ORDER — INSULIN PUMP SYRINGE, 3 ML
EACH MISCELLANEOUS
Qty: 1 EACH | Refills: 0 | Status: ON HOLD | OUTPATIENT
Start: 2021-04-08 | End: 2021-06-15 | Stop reason: HOSPADM

## 2021-04-08 RX ORDER — LANCETS
EACH MISCELLANEOUS
Qty: 200 EACH | Refills: 3 | Status: ON HOLD | OUTPATIENT
Start: 2021-04-08 | End: 2021-06-15 | Stop reason: HOSPADM

## 2021-04-09 ENCOUNTER — PATIENT MESSAGE (OUTPATIENT)
Dept: OBSTETRICS AND GYNECOLOGY | Facility: CLINIC | Age: 41
End: 2021-04-09

## 2021-04-09 ENCOUNTER — TELEPHONE (OUTPATIENT)
Dept: DIABETES | Facility: CLINIC | Age: 41
End: 2021-04-09

## 2021-04-12 ENCOUNTER — PATIENT MESSAGE (OUTPATIENT)
Dept: ADMINISTRATIVE | Facility: OTHER | Age: 41
End: 2021-04-12

## 2021-04-14 ENCOUNTER — CLINICAL SUPPORT (OUTPATIENT)
Dept: OBSTETRICS AND GYNECOLOGY | Facility: CLINIC | Age: 41
End: 2021-04-14
Payer: COMMERCIAL

## 2021-04-14 ENCOUNTER — PROCEDURE VISIT (OUTPATIENT)
Dept: MATERNAL FETAL MEDICINE | Facility: CLINIC | Age: 41
End: 2021-04-14
Payer: COMMERCIAL

## 2021-04-14 ENCOUNTER — ROUTINE PRENATAL (OUTPATIENT)
Dept: OBSTETRICS AND GYNECOLOGY | Facility: CLINIC | Age: 41
End: 2021-04-14
Payer: COMMERCIAL

## 2021-04-14 VITALS — BODY MASS INDEX: 28.4 KG/M2 | DIASTOLIC BLOOD PRESSURE: 76 MMHG | SYSTOLIC BLOOD PRESSURE: 122 MMHG | WEIGHT: 170.63 LBS

## 2021-04-14 DIAGNOSIS — O30.002 TWIN GESTATION IN SECOND TRIMESTER, UNSPECIFIED MULTIPLE GESTATION TYPE: ICD-10-CM

## 2021-04-14 DIAGNOSIS — O24.419 GESTATIONAL DIABETES MELLITUS (GDM) IN THIRD TRIMESTER, GESTATIONAL DIABETES METHOD OF CONTROL UNSPECIFIED: Primary | ICD-10-CM

## 2021-04-14 DIAGNOSIS — O09.523 MULTIGRAVIDA OF ADVANCED MATERNAL AGE IN THIRD TRIMESTER: ICD-10-CM

## 2021-04-14 DIAGNOSIS — O30.033 MONOCHORIONIC DIAMNIOTIC TWIN GESTATION IN THIRD TRIMESTER: Primary | ICD-10-CM

## 2021-04-14 DIAGNOSIS — O30.032 MONOCHORIONIC DIAMNIOTIC TWIN GESTATION IN SECOND TRIMESTER: ICD-10-CM

## 2021-04-14 DIAGNOSIS — O35.9XX1 KNOWN FETAL ANOMALY, ANTEPARTUM, FETUS 1: ICD-10-CM

## 2021-04-14 DIAGNOSIS — O09.92 PREGNANCY, SUPERVISION, HIGH-RISK, SECOND TRIMESTER: ICD-10-CM

## 2021-04-14 DIAGNOSIS — Z23 NEED FOR DIPHTHERIA-TETANUS-PERTUSSIS (TDAP) VACCINE: Primary | ICD-10-CM

## 2021-04-14 DIAGNOSIS — O35.9XX2: ICD-10-CM

## 2021-04-14 PROCEDURE — 99999 PR PBB SHADOW E&M-EST. PATIENT-LVL III: CPT | Mod: PBBFAC,,, | Performed by: OBSTETRICS & GYNECOLOGY

## 2021-04-14 PROCEDURE — 90471 TDAP VACCINE GREATER THAN OR EQUAL TO 7YO IM: ICD-10-PCS | Mod: S$GLB,,, | Performed by: OBSTETRICS & GYNECOLOGY

## 2021-04-14 PROCEDURE — 90471 IMMUNIZATION ADMIN: CPT | Mod: S$GLB,,, | Performed by: OBSTETRICS & GYNECOLOGY

## 2021-04-14 PROCEDURE — 76816 OB US FOLLOW-UP PER FETUS: CPT | Mod: S$GLB,,, | Performed by: OBSTETRICS & GYNECOLOGY

## 2021-04-14 PROCEDURE — 76816 PR  US,PREGNANT UTERUS,F/U,TRANSABD APP: ICD-10-PCS | Mod: S$GLB,,, | Performed by: OBSTETRICS & GYNECOLOGY

## 2021-04-14 PROCEDURE — 90715 TDAP VACCINE 7 YRS/> IM: CPT | Mod: S$GLB,,, | Performed by: OBSTETRICS & GYNECOLOGY

## 2021-04-14 PROCEDURE — 90715 TDAP VACCINE GREATER THAN OR EQUAL TO 7YO IM: ICD-10-PCS | Mod: S$GLB,,, | Performed by: OBSTETRICS & GYNECOLOGY

## 2021-04-14 PROCEDURE — 0502F SUBSEQUENT PRENATAL CARE: CPT | Mod: CPTII,S$GLB,, | Performed by: OBSTETRICS & GYNECOLOGY

## 2021-04-14 PROCEDURE — 99999 PR PBB SHADOW E&M-EST. PATIENT-LVL III: ICD-10-PCS | Mod: PBBFAC,,, | Performed by: OBSTETRICS & GYNECOLOGY

## 2021-04-14 PROCEDURE — 0502F PR SUBSEQUENT PRENATAL CARE: ICD-10-PCS | Mod: CPTII,S$GLB,, | Performed by: OBSTETRICS & GYNECOLOGY

## 2021-04-17 PROBLEM — O24.419 GESTATIONAL DIABETES MELLITUS (GDM) IN THIRD TRIMESTER: Status: ACTIVE | Noted: 2021-04-17

## 2021-04-19 ENCOUNTER — PATIENT MESSAGE (OUTPATIENT)
Dept: DIABETES | Facility: CLINIC | Age: 41
End: 2021-04-19

## 2021-04-19 ENCOUNTER — CLINICAL SUPPORT (OUTPATIENT)
Dept: DIABETES | Facility: CLINIC | Age: 41
End: 2021-04-19
Payer: COMMERCIAL

## 2021-04-19 VITALS — WEIGHT: 171.06 LBS | BODY MASS INDEX: 28.47 KG/M2

## 2021-04-19 DIAGNOSIS — O24.419 GESTATIONAL DIABETES MELLITUS (GDM) IN SECOND TRIMESTER, GESTATIONAL DIABETES METHOD OF CONTROL UNSPECIFIED: ICD-10-CM

## 2021-04-19 PROCEDURE — G0108 PR DIAB MANAGE TRN  PER INDIV: ICD-10-PCS | Mod: S$GLB,,, | Performed by: DIETITIAN, REGISTERED

## 2021-04-19 PROCEDURE — 99999 PR PBB SHADOW E&M-EST. PATIENT-LVL II: CPT | Mod: PBBFAC,,, | Performed by: DIETITIAN, REGISTERED

## 2021-04-19 PROCEDURE — 99999 PR PBB SHADOW E&M-EST. PATIENT-LVL II: ICD-10-PCS | Mod: PBBFAC,,, | Performed by: DIETITIAN, REGISTERED

## 2021-04-19 PROCEDURE — G0108 DIAB MANAGE TRN  PER INDIV: HCPCS | Mod: S$GLB,,, | Performed by: DIETITIAN, REGISTERED

## 2021-04-20 ENCOUNTER — PATIENT MESSAGE (OUTPATIENT)
Dept: OBSTETRICS AND GYNECOLOGY | Facility: CLINIC | Age: 41
End: 2021-04-20

## 2021-04-29 ENCOUNTER — PATIENT MESSAGE (OUTPATIENT)
Dept: MATERNAL FETAL MEDICINE | Facility: CLINIC | Age: 41
End: 2021-04-29

## 2021-05-03 ENCOUNTER — PATIENT MESSAGE (OUTPATIENT)
Dept: DIABETES | Facility: CLINIC | Age: 41
End: 2021-05-03

## 2021-05-04 ENCOUNTER — LAB VISIT (OUTPATIENT)
Dept: LAB | Facility: OTHER | Age: 41
End: 2021-05-04
Attending: OBSTETRICS & GYNECOLOGY
Payer: COMMERCIAL

## 2021-05-04 ENCOUNTER — ROUTINE PRENATAL (OUTPATIENT)
Dept: OBSTETRICS AND GYNECOLOGY | Facility: CLINIC | Age: 41
End: 2021-05-04
Payer: COMMERCIAL

## 2021-05-04 VITALS
SYSTOLIC BLOOD PRESSURE: 130 MMHG | WEIGHT: 171.31 LBS | BODY MASS INDEX: 28.51 KG/M2 | DIASTOLIC BLOOD PRESSURE: 76 MMHG

## 2021-05-04 DIAGNOSIS — O30.032 MONOCHORIONIC DIAMNIOTIC TWIN GESTATION IN SECOND TRIMESTER: ICD-10-CM

## 2021-05-04 DIAGNOSIS — O09.93 PREGNANCY, SUPERVISION, HIGH-RISK, THIRD TRIMESTER: ICD-10-CM

## 2021-05-04 DIAGNOSIS — O99.013 ANEMIA DURING PREGNANCY IN THIRD TRIMESTER: Primary | ICD-10-CM

## 2021-05-04 DIAGNOSIS — O99.013 ANEMIA DURING PREGNANCY IN THIRD TRIMESTER: ICD-10-CM

## 2021-05-04 LAB
BASOPHILS # BLD AUTO: 0.02 K/UL (ref 0–0.2)
BASOPHILS NFR BLD: 0.3 % (ref 0–1.9)
DIFFERENTIAL METHOD: ABNORMAL
EOSINOPHIL # BLD AUTO: 0.1 K/UL (ref 0–0.5)
EOSINOPHIL NFR BLD: 0.9 % (ref 0–8)
ERYTHROCYTE [DISTWIDTH] IN BLOOD BY AUTOMATED COUNT: 19.5 % (ref 11.5–14.5)
HCT VFR BLD AUTO: 35.7 % (ref 37–48.5)
HGB BLD-MCNC: 11 G/DL (ref 12–16)
IMM GRANULOCYTES # BLD AUTO: 0.06 K/UL (ref 0–0.04)
IMM GRANULOCYTES NFR BLD AUTO: 0.9 % (ref 0–0.5)
LYMPHOCYTES # BLD AUTO: 1.7 K/UL (ref 1–4.8)
LYMPHOCYTES NFR BLD: 24.3 % (ref 18–48)
MCH RBC QN AUTO: 28.8 PG (ref 27–31)
MCHC RBC AUTO-ENTMCNC: 30.8 G/DL (ref 32–36)
MCV RBC AUTO: 94 FL (ref 82–98)
MONOCYTES # BLD AUTO: 0.6 K/UL (ref 0.3–1)
MONOCYTES NFR BLD: 8 % (ref 4–15)
NEUTROPHILS # BLD AUTO: 4.6 K/UL (ref 1.8–7.7)
NEUTROPHILS NFR BLD: 65.6 % (ref 38–73)
NRBC BLD-RTO: 0 /100 WBC
PLATELET # BLD AUTO: 231 K/UL (ref 150–450)
PMV BLD AUTO: 9.5 FL (ref 9.2–12.9)
RBC # BLD AUTO: 3.82 M/UL (ref 4–5.4)
WBC # BLD AUTO: 6.96 K/UL (ref 3.9–12.7)

## 2021-05-04 PROCEDURE — 82728 ASSAY OF FERRITIN: CPT | Performed by: OBSTETRICS & GYNECOLOGY

## 2021-05-04 PROCEDURE — 0502F SUBSEQUENT PRENATAL CARE: CPT | Mod: CPTII,S$GLB,, | Performed by: OBSTETRICS & GYNECOLOGY

## 2021-05-04 PROCEDURE — 83540 ASSAY OF IRON: CPT | Performed by: OBSTETRICS & GYNECOLOGY

## 2021-05-04 PROCEDURE — 85025 COMPLETE CBC W/AUTO DIFF WBC: CPT | Performed by: OBSTETRICS & GYNECOLOGY

## 2021-05-04 PROCEDURE — 0502F PR SUBSEQUENT PRENATAL CARE: ICD-10-PCS | Mod: CPTII,S$GLB,, | Performed by: OBSTETRICS & GYNECOLOGY

## 2021-05-04 PROCEDURE — 86592 SYPHILIS TEST NON-TREP QUAL: CPT | Performed by: OBSTETRICS & GYNECOLOGY

## 2021-05-04 PROCEDURE — 99999 PR PBB SHADOW E&M-EST. PATIENT-LVL II: CPT | Mod: PBBFAC,,, | Performed by: OBSTETRICS & GYNECOLOGY

## 2021-05-04 PROCEDURE — 36415 COLL VENOUS BLD VENIPUNCTURE: CPT | Performed by: OBSTETRICS & GYNECOLOGY

## 2021-05-04 PROCEDURE — 86703 HIV-1/HIV-2 1 RESULT ANTBDY: CPT | Performed by: OBSTETRICS & GYNECOLOGY

## 2021-05-04 PROCEDURE — 99999 PR PBB SHADOW E&M-EST. PATIENT-LVL II: ICD-10-PCS | Mod: PBBFAC,,, | Performed by: OBSTETRICS & GYNECOLOGY

## 2021-05-05 LAB
FERRITIN SERPL-MCNC: 19 NG/ML (ref 20–300)
HIV 1+2 AB+HIV1 P24 AG SERPL QL IA: NEGATIVE
IRON SERPL-MCNC: 504 UG/DL (ref 30–160)
RPR SER QL: NORMAL
SATURATED IRON: 76 % (ref 20–50)
TOTAL IRON BINDING CAPACITY: 663 UG/DL (ref 250–450)
TRANSFERRIN SERPL-MCNC: 448 MG/DL (ref 200–375)

## 2021-05-10 ENCOUNTER — PATIENT MESSAGE (OUTPATIENT)
Dept: ADMINISTRATIVE | Facility: OTHER | Age: 41
End: 2021-05-10

## 2021-05-10 ENCOUNTER — PATIENT MESSAGE (OUTPATIENT)
Dept: MATERNAL FETAL MEDICINE | Facility: CLINIC | Age: 41
End: 2021-05-10

## 2021-05-12 ENCOUNTER — PROCEDURE VISIT (OUTPATIENT)
Dept: MATERNAL FETAL MEDICINE | Facility: CLINIC | Age: 41
End: 2021-05-12
Payer: COMMERCIAL

## 2021-05-12 DIAGNOSIS — O30.033 MONOCHORIONIC DIAMNIOTIC TWIN GESTATION IN THIRD TRIMESTER: Primary | ICD-10-CM

## 2021-05-12 DIAGNOSIS — O30.002 TWIN GESTATION IN SECOND TRIMESTER, UNSPECIFIED MULTIPLE GESTATION TYPE: ICD-10-CM

## 2021-05-12 DIAGNOSIS — O24.410 DIET CONTROLLED GESTATIONAL DIABETES MELLITUS (GDM) IN THIRD TRIMESTER: ICD-10-CM

## 2021-05-12 PROCEDURE — 76819 FETAL BIOPHYS PROFIL W/O NST: CPT | Mod: S$GLB,,, | Performed by: OBSTETRICS & GYNECOLOGY

## 2021-05-12 PROCEDURE — 76819 PR US, OB, FETAL BIOPHYSICAL, W/O NST: ICD-10-PCS | Mod: 59,S$GLB,, | Performed by: OBSTETRICS & GYNECOLOGY

## 2021-05-17 ENCOUNTER — PATIENT MESSAGE (OUTPATIENT)
Dept: DIABETES | Facility: CLINIC | Age: 41
End: 2021-05-17

## 2021-05-19 ENCOUNTER — HOSPITAL ENCOUNTER (OUTPATIENT)
Dept: PERINATAL CARE | Facility: OTHER | Age: 41
Discharge: HOME | End: 2021-05-19
Attending: OBSTETRICS & GYNECOLOGY
Payer: COMMERCIAL

## 2021-05-19 ENCOUNTER — ROUTINE PRENATAL (OUTPATIENT)
Dept: OBSTETRICS AND GYNECOLOGY | Facility: CLINIC | Age: 41
End: 2021-05-19
Payer: COMMERCIAL

## 2021-05-19 VITALS
DIASTOLIC BLOOD PRESSURE: 80 MMHG | SYSTOLIC BLOOD PRESSURE: 138 MMHG | WEIGHT: 170.88 LBS | BODY MASS INDEX: 28.43 KG/M2

## 2021-05-19 DIAGNOSIS — O30.032 MONOCHORIONIC DIAMNIOTIC TWIN GESTATION IN SECOND TRIMESTER: ICD-10-CM

## 2021-05-19 DIAGNOSIS — O24.419 GESTATIONAL DIABETES MELLITUS (GDM) IN THIRD TRIMESTER, GESTATIONAL DIABETES METHOD OF CONTROL UNSPECIFIED: ICD-10-CM

## 2021-05-19 DIAGNOSIS — O30.033 MONOCHORIONIC DIAMNIOTIC TWIN GESTATION IN THIRD TRIMESTER: Primary | ICD-10-CM

## 2021-05-19 PROCEDURE — 59025 FETAL NON-STRESS TEST: CPT | Mod: 26,,, | Performed by: OBSTETRICS & GYNECOLOGY

## 2021-05-19 PROCEDURE — 59025 FETAL NON-STRESS TEST: CPT

## 2021-05-19 PROCEDURE — 76815 OB US LIMITED FETUS(S): CPT

## 2021-05-19 PROCEDURE — 0502F SUBSEQUENT PRENATAL CARE: CPT | Mod: CPTII,S$GLB,, | Performed by: OBSTETRICS & GYNECOLOGY

## 2021-05-19 PROCEDURE — 59025 PR FETAL 2N-STRESS TEST: ICD-10-PCS | Mod: 26,59,, | Performed by: OBSTETRICS & GYNECOLOGY

## 2021-05-19 PROCEDURE — 0502F PR SUBSEQUENT PRENATAL CARE: ICD-10-PCS | Mod: CPTII,S$GLB,, | Performed by: OBSTETRICS & GYNECOLOGY

## 2021-05-19 PROCEDURE — 99999 PR PBB SHADOW E&M-EST. PATIENT-LVL III: ICD-10-PCS | Mod: PBBFAC,,, | Performed by: OBSTETRICS & GYNECOLOGY

## 2021-05-19 PROCEDURE — 99999 PR PBB SHADOW E&M-EST. PATIENT-LVL III: CPT | Mod: PBBFAC,,, | Performed by: OBSTETRICS & GYNECOLOGY

## 2021-05-26 ENCOUNTER — PATIENT MESSAGE (OUTPATIENT)
Dept: OBSTETRICS AND GYNECOLOGY | Facility: CLINIC | Age: 41
End: 2021-05-26

## 2021-05-27 ENCOUNTER — TELEPHONE (OUTPATIENT)
Dept: OBSTETRICS AND GYNECOLOGY | Facility: CLINIC | Age: 41
End: 2021-05-27

## 2021-05-28 ENCOUNTER — ROUTINE PRENATAL (OUTPATIENT)
Dept: OBSTETRICS AND GYNECOLOGY | Facility: CLINIC | Age: 41
End: 2021-05-28
Payer: COMMERCIAL

## 2021-05-28 ENCOUNTER — PROCEDURE VISIT (OUTPATIENT)
Dept: MATERNAL FETAL MEDICINE | Facility: CLINIC | Age: 41
End: 2021-05-28
Payer: COMMERCIAL

## 2021-05-28 ENCOUNTER — OFFICE VISIT (OUTPATIENT)
Dept: MATERNAL FETAL MEDICINE | Facility: CLINIC | Age: 41
End: 2021-05-28
Payer: COMMERCIAL

## 2021-05-28 VITALS
BODY MASS INDEX: 29.42 KG/M2 | SYSTOLIC BLOOD PRESSURE: 112 MMHG | DIASTOLIC BLOOD PRESSURE: 70 MMHG | WEIGHT: 176.81 LBS

## 2021-05-28 VITALS
SYSTOLIC BLOOD PRESSURE: 112 MMHG | BODY MASS INDEX: 29.39 KG/M2 | WEIGHT: 176.56 LBS | DIASTOLIC BLOOD PRESSURE: 70 MMHG

## 2021-05-28 DIAGNOSIS — F32.A ANXIETY AND DEPRESSION: ICD-10-CM

## 2021-05-28 DIAGNOSIS — O30.033 MONOCHORIONIC DIAMNIOTIC TWIN GESTATION IN THIRD TRIMESTER: Primary | ICD-10-CM

## 2021-05-28 DIAGNOSIS — O09.523 ADVANCED MATERNAL AGE IN MULTIGRAVIDA, THIRD TRIMESTER: ICD-10-CM

## 2021-05-28 DIAGNOSIS — F41.9 ANXIETY AND DEPRESSION: ICD-10-CM

## 2021-05-28 DIAGNOSIS — O26.899 PREGNANCY COMPLICATED BY UMBILICAL CORD VARIX, ANTEPARTUM: ICD-10-CM

## 2021-05-28 DIAGNOSIS — O30.033 MONOCHORIONIC DIAMNIOTIC TWIN GESTATION IN THIRD TRIMESTER: ICD-10-CM

## 2021-05-28 DIAGNOSIS — O35.9XX1 KNOWN FETAL ANOMALY, ANTEPARTUM, FETUS 1: ICD-10-CM

## 2021-05-28 DIAGNOSIS — O30.002 TWIN GESTATION IN SECOND TRIMESTER, UNSPECIFIED MULTIPLE GESTATION TYPE: ICD-10-CM

## 2021-05-28 DIAGNOSIS — O24.410 DIET CONTROLLED GESTATIONAL DIABETES MELLITUS (GDM) IN THIRD TRIMESTER: ICD-10-CM

## 2021-05-28 DIAGNOSIS — O35.9XX2: ICD-10-CM

## 2021-05-28 PROCEDURE — 3008F BODY MASS INDEX DOCD: CPT | Mod: CPTII,S$GLB,, | Performed by: OBSTETRICS & GYNECOLOGY

## 2021-05-28 PROCEDURE — 87081 CULTURE SCREEN ONLY: CPT | Performed by: OBSTETRICS & GYNECOLOGY

## 2021-05-28 PROCEDURE — 99999 PR PBB SHADOW E&M-EST. PATIENT-LVL III: ICD-10-PCS | Mod: PBBFAC,,, | Performed by: OBSTETRICS & GYNECOLOGY

## 2021-05-28 PROCEDURE — 0502F SUBSEQUENT PRENATAL CARE: CPT | Mod: CPTII,S$GLB,, | Performed by: OBSTETRICS & GYNECOLOGY

## 2021-05-28 PROCEDURE — 76819 FETAL BIOPHYS PROFIL W/O NST: CPT | Mod: S$GLB,,, | Performed by: OBSTETRICS & GYNECOLOGY

## 2021-05-28 PROCEDURE — 76816 PR  US,PREGNANT UTERUS,F/U,TRANSABD APP: ICD-10-PCS | Mod: S$GLB,,, | Performed by: OBSTETRICS & GYNECOLOGY

## 2021-05-28 PROCEDURE — 1126F PR PAIN SEVERITY QUANTIFIED, NO PAIN PRESENT: ICD-10-PCS | Mod: S$GLB,,, | Performed by: OBSTETRICS & GYNECOLOGY

## 2021-05-28 PROCEDURE — 99214 PR OFFICE/OUTPT VISIT, EST, LEVL IV, 30-39 MIN: ICD-10-PCS | Mod: 25,S$GLB,, | Performed by: OBSTETRICS & GYNECOLOGY

## 2021-05-28 PROCEDURE — 1126F AMNT PAIN NOTED NONE PRSNT: CPT | Mod: S$GLB,,, | Performed by: OBSTETRICS & GYNECOLOGY

## 2021-05-28 PROCEDURE — 99999 PR PBB SHADOW E&M-EST. PATIENT-LVL III: CPT | Mod: PBBFAC,,, | Performed by: OBSTETRICS & GYNECOLOGY

## 2021-05-28 PROCEDURE — 76819 PR US, OB, FETAL BIOPHYSICAL, W/O NST: ICD-10-PCS | Mod: S$GLB,,, | Performed by: OBSTETRICS & GYNECOLOGY

## 2021-05-28 PROCEDURE — 3008F PR BODY MASS INDEX (BMI) DOCUMENTED: ICD-10-PCS | Mod: CPTII,S$GLB,, | Performed by: OBSTETRICS & GYNECOLOGY

## 2021-05-28 PROCEDURE — 0502F PR SUBSEQUENT PRENATAL CARE: ICD-10-PCS | Mod: CPTII,S$GLB,, | Performed by: OBSTETRICS & GYNECOLOGY

## 2021-05-28 PROCEDURE — 99214 OFFICE O/P EST MOD 30 MIN: CPT | Mod: 25,S$GLB,, | Performed by: OBSTETRICS & GYNECOLOGY

## 2021-05-28 PROCEDURE — 76816 OB US FOLLOW-UP PER FETUS: CPT | Mod: S$GLB,,, | Performed by: OBSTETRICS & GYNECOLOGY

## 2021-05-28 RX ORDER — FERROUS SULFATE 324(65)MG
324 TABLET, DELAYED RELEASE (ENTERIC COATED) ORAL DAILY
Status: ON HOLD | COMMUNITY
End: 2021-06-15 | Stop reason: HOSPADM

## 2021-05-28 RX ORDER — MAGNESIUM 30 MG
TABLET ORAL ONCE
Status: ON HOLD | COMMUNITY
End: 2021-06-15 | Stop reason: HOSPADM

## 2021-05-28 RX ORDER — FERROUS GLUCONATE 324(38)MG
324 TABLET ORAL
COMMUNITY

## 2021-05-28 RX ORDER — HYDROCORTISONE 25 MG/G
CREAM TOPICAL 2 TIMES DAILY
Qty: 28 G | Refills: 2 | Status: ON HOLD | OUTPATIENT
Start: 2021-05-28 | End: 2021-06-15 | Stop reason: HOSPADM

## 2021-05-28 RX ORDER — LANCETS 28 GAUGE
EACH MISCELLANEOUS
Status: ON HOLD | COMMUNITY
Start: 2021-04-08 | End: 2021-06-15 | Stop reason: HOSPADM

## 2021-05-31 LAB — BACTERIA SPEC AEROBE CULT: NORMAL

## 2021-06-01 ENCOUNTER — PATIENT MESSAGE (OUTPATIENT)
Dept: MATERNAL FETAL MEDICINE | Facility: CLINIC | Age: 41
End: 2021-06-01

## 2021-06-01 ENCOUNTER — PATIENT MESSAGE (OUTPATIENT)
Dept: OBSTETRICS AND GYNECOLOGY | Facility: CLINIC | Age: 41
End: 2021-06-01

## 2021-06-02 ENCOUNTER — HOSPITAL ENCOUNTER (OUTPATIENT)
Dept: PERINATAL CARE | Facility: OTHER | Age: 41
Discharge: HOME | End: 2021-06-02
Attending: OBSTETRICS & GYNECOLOGY
Payer: COMMERCIAL

## 2021-06-02 DIAGNOSIS — O30.033 MONOCHORIONIC DIAMNIOTIC TWIN GESTATION IN THIRD TRIMESTER: ICD-10-CM

## 2021-06-02 PROCEDURE — 76819 FETAL BIOPHYS PROFIL W/O NST: CPT | Mod: 26,,, | Performed by: OBSTETRICS & GYNECOLOGY

## 2021-06-02 PROCEDURE — 76819 PR US, OB, FETAL BIOPHYSICAL, W/O NST: ICD-10-PCS | Mod: 26,59,, | Performed by: OBSTETRICS & GYNECOLOGY

## 2021-06-02 PROCEDURE — 76819 FETAL BIOPHYS PROFIL W/O NST: CPT

## 2021-06-04 DIAGNOSIS — Z36.89 ENCOUNTER FOR ULTRASOUND TO ASSESS FETAL GROWTH: Primary | ICD-10-CM

## 2021-06-10 ENCOUNTER — PROCEDURE VISIT (OUTPATIENT)
Dept: MATERNAL FETAL MEDICINE | Facility: CLINIC | Age: 41
End: 2021-06-10
Payer: COMMERCIAL

## 2021-06-10 ENCOUNTER — OFFICE VISIT (OUTPATIENT)
Dept: MATERNAL FETAL MEDICINE | Facility: CLINIC | Age: 41
End: 2021-06-10
Payer: COMMERCIAL

## 2021-06-10 ENCOUNTER — ROUTINE PRENATAL (OUTPATIENT)
Dept: OBSTETRICS AND GYNECOLOGY | Facility: CLINIC | Age: 41
End: 2021-06-10
Payer: COMMERCIAL

## 2021-06-10 VITALS
DIASTOLIC BLOOD PRESSURE: 90 MMHG | SYSTOLIC BLOOD PRESSURE: 144 MMHG | BODY MASS INDEX: 28.91 KG/M2 | WEIGHT: 173.75 LBS

## 2021-06-10 DIAGNOSIS — O24.410 DIET CONTROLLED GESTATIONAL DIABETES MELLITUS (GDM) IN THIRD TRIMESTER: ICD-10-CM

## 2021-06-10 DIAGNOSIS — O09.523 ADVANCED MATERNAL AGE IN MULTIGRAVIDA, THIRD TRIMESTER: ICD-10-CM

## 2021-06-10 DIAGNOSIS — O30.033 MONOCHORIONIC DIAMNIOTIC TWIN GESTATION IN THIRD TRIMESTER: Primary | ICD-10-CM

## 2021-06-10 DIAGNOSIS — Z36.89 ENCOUNTER FOR ULTRASOUND TO ASSESS FETAL GROWTH: ICD-10-CM

## 2021-06-10 DIAGNOSIS — O24.419 GESTATIONAL DIABETES MELLITUS (GDM) IN THIRD TRIMESTER, GESTATIONAL DIABETES METHOD OF CONTROL UNSPECIFIED: ICD-10-CM

## 2021-06-10 PROCEDURE — 99213 OFFICE O/P EST LOW 20 MIN: CPT | Mod: 25,S$GLB,, | Performed by: OBSTETRICS & GYNECOLOGY

## 2021-06-10 PROCEDURE — 0502F SUBSEQUENT PRENATAL CARE: CPT | Mod: CPTII,S$GLB,, | Performed by: OBSTETRICS & GYNECOLOGY

## 2021-06-10 PROCEDURE — 99999 PR PBB SHADOW E&M-EST. PATIENT-LVL III: ICD-10-PCS | Mod: PBBFAC,,, | Performed by: OBSTETRICS & GYNECOLOGY

## 2021-06-10 PROCEDURE — 99999 PR PBB SHADOW E&M-EST. PATIENT-LVL III: CPT | Mod: PBBFAC,,, | Performed by: OBSTETRICS & GYNECOLOGY

## 2021-06-10 PROCEDURE — 0502F PR SUBSEQUENT PRENATAL CARE: ICD-10-PCS | Mod: CPTII,S$GLB,, | Performed by: OBSTETRICS & GYNECOLOGY

## 2021-06-10 PROCEDURE — 76819 PR US, OB, FETAL BIOPHYSICAL, W/O NST: ICD-10-PCS | Mod: S$GLB,,, | Performed by: OBSTETRICS & GYNECOLOGY

## 2021-06-10 PROCEDURE — 99213 PR OFFICE/OUTPT VISIT, EST, LEVL III, 20-29 MIN: ICD-10-PCS | Mod: 25,S$GLB,, | Performed by: OBSTETRICS & GYNECOLOGY

## 2021-06-10 PROCEDURE — 76819 FETAL BIOPHYS PROFIL W/O NST: CPT | Mod: S$GLB,,, | Performed by: OBSTETRICS & GYNECOLOGY

## 2021-06-11 ENCOUNTER — ANESTHESIA EVENT (OUTPATIENT)
Dept: OBSTETRICS AND GYNECOLOGY | Facility: OTHER | Age: 41
End: 2021-06-11
Payer: COMMERCIAL

## 2021-06-11 ENCOUNTER — PATIENT MESSAGE (OUTPATIENT)
Dept: OBSTETRICS AND GYNECOLOGY | Facility: CLINIC | Age: 41
End: 2021-06-11

## 2021-06-11 ENCOUNTER — ANESTHESIA (OUTPATIENT)
Dept: OBSTETRICS AND GYNECOLOGY | Facility: OTHER | Age: 41
End: 2021-06-11
Payer: COMMERCIAL

## 2021-06-11 ENCOUNTER — HOSPITAL ENCOUNTER (INPATIENT)
Facility: OTHER | Age: 41
LOS: 4 days | Discharge: HOME | End: 2021-06-15
Attending: OBSTETRICS & GYNECOLOGY | Admitting: OBSTETRICS & GYNECOLOGY
Payer: COMMERCIAL

## 2021-06-11 ENCOUNTER — PATIENT MESSAGE (OUTPATIENT)
Dept: OBSTETRICS AND GYNECOLOGY | Facility: OTHER | Age: 41
End: 2021-06-11

## 2021-06-11 ENCOUNTER — PATIENT MESSAGE (OUTPATIENT)
Dept: MATERNAL FETAL MEDICINE | Facility: CLINIC | Age: 41
End: 2021-06-11

## 2021-06-11 DIAGNOSIS — O30.033 MONOCHORIONIC DIAMNIOTIC TWIN GESTATION IN THIRD TRIMESTER: ICD-10-CM

## 2021-06-11 PROBLEM — Z34.90 ENCOUNTER FOR INDUCTION OF LABOR: Status: ACTIVE | Noted: 2021-06-11

## 2021-06-11 PROBLEM — Z34.90 ENCOUNTER FOR INDUCTION OF LABOR: Status: RESOLVED | Noted: 2021-06-11 | Resolved: 2021-06-11

## 2021-06-11 LAB
ABO + RH BLD: NORMAL
BASOPHILS # BLD AUTO: 0.02 K/UL (ref 0–0.2)
BASOPHILS NFR BLD: 0.4 % (ref 0–1.9)
BLD GP AB SCN CELLS X3 SERPL QL: NORMAL
BLD PROD TYP BPU: NORMAL
BLOOD UNIT EXPIRATION DATE: NORMAL
BLOOD UNIT TYPE CODE: 5100
BLOOD UNIT TYPE: NORMAL
CODING SYSTEM: NORMAL
DIFFERENTIAL METHOD: ABNORMAL
DISPENSE STATUS: NORMAL
EOSINOPHIL # BLD AUTO: 0 K/UL (ref 0–0.5)
EOSINOPHIL NFR BLD: 0.7 % (ref 0–8)
ERYTHROCYTE [DISTWIDTH] IN BLOOD BY AUTOMATED COUNT: 18.5 % (ref 11.5–14.5)
HCT VFR BLD AUTO: 39.5 % (ref 37–48.5)
HGB BLD-MCNC: 13.2 G/DL (ref 12–16)
IMM GRANULOCYTES # BLD AUTO: 0.03 K/UL (ref 0–0.04)
IMM GRANULOCYTES NFR BLD AUTO: 0.5 % (ref 0–0.5)
LYMPHOCYTES # BLD AUTO: 1.5 K/UL (ref 1–4.8)
LYMPHOCYTES NFR BLD: 26.7 % (ref 18–48)
MCH RBC QN AUTO: 31.7 PG (ref 27–31)
MCHC RBC AUTO-ENTMCNC: 33.4 G/DL (ref 32–36)
MCV RBC AUTO: 95 FL (ref 82–98)
MONOCYTES # BLD AUTO: 0.5 K/UL (ref 0.3–1)
MONOCYTES NFR BLD: 9 % (ref 4–15)
NEUTROPHILS # BLD AUTO: 3.6 K/UL (ref 1.8–7.7)
NEUTROPHILS NFR BLD: 62.7 % (ref 38–73)
NRBC BLD-RTO: 0 /100 WBC
PLATELET # BLD AUTO: 182 K/UL (ref 150–450)
PMV BLD AUTO: 9.9 FL (ref 9.2–12.9)
POCT GLUCOSE: 65 MG/DL (ref 70–110)
RBC # BLD AUTO: 4.17 M/UL (ref 4–5.4)
TRANS ERYTHROCYTES VOL PATIENT: NORMAL ML
WBC # BLD AUTO: 5.66 K/UL (ref 3.9–12.7)

## 2021-06-11 PROCEDURE — 62326 NJX INTERLAMINAR LMBR/SAC: CPT | Performed by: ANESTHESIOLOGY

## 2021-06-11 PROCEDURE — 59510 CESAREAN DELIVERY: CPT | Mod: 76,AT,, | Performed by: OBSTETRICS & GYNECOLOGY

## 2021-06-11 PROCEDURE — 01968 ANES/ANALG CS DLVR NEURAXIAL: CPT | Mod: QY,,, | Performed by: ANESTHESIOLOGY

## 2021-06-11 PROCEDURE — 88307 TISSUE EXAM BY PATHOLOGIST: CPT | Mod: 26,,, | Performed by: PATHOLOGY

## 2021-06-11 PROCEDURE — P9021 RED BLOOD CELLS UNIT: HCPCS | Performed by: ANESTHESIOLOGY

## 2021-06-11 PROCEDURE — 51702 INSERT TEMP BLADDER CATH: CPT

## 2021-06-11 PROCEDURE — 25000003 PHARM REV CODE 250: Performed by: STUDENT IN AN ORGANIZED HEALTH CARE EDUCATION/TRAINING PROGRAM

## 2021-06-11 PROCEDURE — 59510 PR FULL ROUT OBSTE CARE,CESAREAN DELIV: ICD-10-PCS | Mod: 76,AT,, | Performed by: OBSTETRICS & GYNECOLOGY

## 2021-06-11 PROCEDURE — 71000033 HC RECOVERY, INTIAL HOUR: Performed by: OBSTETRICS & GYNECOLOGY

## 2021-06-11 PROCEDURE — 36415 COLL VENOUS BLD VENIPUNCTURE: CPT | Performed by: STUDENT IN AN ORGANIZED HEALTH CARE EDUCATION/TRAINING PROGRAM

## 2021-06-11 PROCEDURE — 72200006 HC VAGINAL DELIVERY LEVEL III

## 2021-06-11 PROCEDURE — 37000009 HC ANESTHESIA EA ADD 15 MINS: Performed by: OBSTETRICS & GYNECOLOGY

## 2021-06-11 PROCEDURE — 71000039 HC RECOVERY, EACH ADD'L HOUR: Performed by: OBSTETRICS & GYNECOLOGY

## 2021-06-11 PROCEDURE — 86900 BLOOD TYPING SEROLOGIC ABO: CPT | Performed by: STUDENT IN AN ORGANIZED HEALTH CARE EDUCATION/TRAINING PROGRAM

## 2021-06-11 PROCEDURE — 25000003 PHARM REV CODE 250: Performed by: ANESTHESIOLOGY

## 2021-06-11 PROCEDURE — 01968 PR INSERT CATH,ART,PERCUT,SHORTTERM: ICD-10-PCS | Mod: QY,,, | Performed by: ANESTHESIOLOGY

## 2021-06-11 PROCEDURE — 63600175 PHARM REV CODE 636 W HCPCS: Performed by: STUDENT IN AN ORGANIZED HEALTH CARE EDUCATION/TRAINING PROGRAM

## 2021-06-11 PROCEDURE — 86920 COMPATIBILITY TEST SPIN: CPT | Performed by: ANESTHESIOLOGY

## 2021-06-11 PROCEDURE — 37000008 HC ANESTHESIA 1ST 15 MINUTES: Performed by: OBSTETRICS & GYNECOLOGY

## 2021-06-11 PROCEDURE — 59514 PRA REAN DELIVERY ONLY: ICD-10-PCS | Mod: QY,,, | Performed by: ANESTHESIOLOGY

## 2021-06-11 PROCEDURE — 27200710 HC EPIDURAL INFUSION PUMP SET: Performed by: ANESTHESIOLOGY

## 2021-06-11 PROCEDURE — 36004724 HC OB OR TIME LEV III - 1ST 15 MIN: Performed by: OBSTETRICS & GYNECOLOGY

## 2021-06-11 PROCEDURE — 11000001 HC ACUTE MED/SURG PRIVATE ROOM

## 2021-06-11 PROCEDURE — 88307 TISSUE EXAM BY PATHOLOGIST: CPT | Mod: 59 | Performed by: PATHOLOGY

## 2021-06-11 PROCEDURE — C1751 CATH, INF, PER/CENT/MIDLINE: HCPCS | Performed by: ANESTHESIOLOGY

## 2021-06-11 PROCEDURE — 85025 COMPLETE CBC W/AUTO DIFF WBC: CPT | Performed by: STUDENT IN AN ORGANIZED HEALTH CARE EDUCATION/TRAINING PROGRAM

## 2021-06-11 PROCEDURE — 36004725 HC OB OR TIME LEV III - EA ADD 15 MIN: Performed by: OBSTETRICS & GYNECOLOGY

## 2021-06-11 PROCEDURE — 88307 PR  SURG PATH,LEVEL V: ICD-10-PCS | Mod: 26,,, | Performed by: PATHOLOGY

## 2021-06-11 PROCEDURE — 59514 CESAREAN DELIVERY ONLY: CPT | Mod: QY,,, | Performed by: ANESTHESIOLOGY

## 2021-06-11 RX ORDER — PROCHLORPERAZINE EDISYLATE 5 MG/ML
5 INJECTION INTRAMUSCULAR; INTRAVENOUS EVERY 6 HOURS PRN
Status: DISCONTINUED | OUTPATIENT
Start: 2021-06-12 | End: 2021-06-15 | Stop reason: HOSPADM

## 2021-06-11 RX ORDER — SODIUM CHLORIDE, SODIUM LACTATE, POTASSIUM CHLORIDE, CALCIUM CHLORIDE 600; 310; 30; 20 MG/100ML; MG/100ML; MG/100ML; MG/100ML
INJECTION, SOLUTION INTRAVENOUS CONTINUOUS
Status: DISCONTINUED | OUTPATIENT
Start: 2021-06-11 | End: 2021-06-11

## 2021-06-11 RX ORDER — DOCUSATE SODIUM 100 MG/1
200 CAPSULE, LIQUID FILLED ORAL 2 TIMES DAILY
Status: DISCONTINUED | OUTPATIENT
Start: 2021-06-11 | End: 2021-06-15 | Stop reason: HOSPADM

## 2021-06-11 RX ORDER — CLINDAMYCIN PHOSPHATE 900 MG/50ML
900 INJECTION, SOLUTION INTRAVENOUS
Status: COMPLETED | OUTPATIENT
Start: 2021-06-11 | End: 2021-06-12

## 2021-06-11 RX ORDER — OXYTOCIN/RINGER'S LACTATE 30/500 ML
95 PLASTIC BAG, INJECTION (ML) INTRAVENOUS ONCE
Status: DISCONTINUED | OUTPATIENT
Start: 2021-06-11 | End: 2021-06-11

## 2021-06-11 RX ORDER — CALCIUM CARBONATE 200(500)MG
500 TABLET,CHEWABLE ORAL 3 TIMES DAILY PRN
Status: DISCONTINUED | OUTPATIENT
Start: 2021-06-11 | End: 2021-06-11

## 2021-06-11 RX ORDER — HYDROCODONE BITARTRATE AND ACETAMINOPHEN 500; 5 MG/1; MG/1
TABLET ORAL
Status: DISCONTINUED | OUTPATIENT
Start: 2021-06-11 | End: 2021-06-11

## 2021-06-11 RX ORDER — SERTRALINE HYDROCHLORIDE 25 MG/1
25 TABLET, FILM COATED ORAL DAILY
Status: DISCONTINUED | OUTPATIENT
Start: 2021-06-11 | End: 2021-06-15 | Stop reason: HOSPADM

## 2021-06-11 RX ORDER — ONDANSETRON 2 MG/ML
4 INJECTION INTRAMUSCULAR; INTRAVENOUS EVERY 6 HOURS PRN
Status: ACTIVE | OUTPATIENT
Start: 2021-06-11 | End: 2021-06-12

## 2021-06-11 RX ORDER — CEFAZOLIN SODIUM 1 G/3ML
INJECTION, POWDER, FOR SOLUTION INTRAMUSCULAR; INTRAVENOUS
Status: DISCONTINUED | OUTPATIENT
Start: 2021-06-11 | End: 2021-06-11

## 2021-06-11 RX ORDER — PROCHLORPERAZINE EDISYLATE 5 MG/ML
5 INJECTION INTRAMUSCULAR; INTRAVENOUS EVERY 6 HOURS PRN
Status: DISCONTINUED | OUTPATIENT
Start: 2021-06-11 | End: 2021-06-11

## 2021-06-11 RX ORDER — SODIUM CHLORIDE 0.9 % (FLUSH) 0.9 %
10 SYRINGE (ML) INJECTION
Status: DISCONTINUED | OUTPATIENT
Start: 2021-06-11 | End: 2021-06-15 | Stop reason: HOSPADM

## 2021-06-11 RX ORDER — SIMETHICONE 80 MG
1 TABLET,CHEWABLE ORAL EVERY 6 HOURS PRN
Status: DISCONTINUED | OUTPATIENT
Start: 2021-06-11 | End: 2021-06-15 | Stop reason: HOSPADM

## 2021-06-11 RX ORDER — FAMOTIDINE 10 MG/ML
20 INJECTION INTRAVENOUS ONCE
Status: DISCONTINUED | OUTPATIENT
Start: 2021-06-11 | End: 2021-06-11

## 2021-06-11 RX ORDER — TRANEXAMIC ACID 100 MG/ML
INJECTION, SOLUTION INTRAVENOUS
Status: DISCONTINUED
Start: 2021-06-11 | End: 2021-06-11 | Stop reason: WASHOUT

## 2021-06-11 RX ORDER — ACETAMINOPHEN 10 MG/ML
INJECTION, SOLUTION INTRAVENOUS
Status: DISCONTINUED | OUTPATIENT
Start: 2021-06-11 | End: 2021-06-11

## 2021-06-11 RX ORDER — CEFAZOLIN SODIUM 2 G/50ML
2 SOLUTION INTRAVENOUS ONCE AS NEEDED
Status: DISCONTINUED | OUTPATIENT
Start: 2021-06-11 | End: 2021-06-11

## 2021-06-11 RX ORDER — MISOPROSTOL 200 UG/1
800 TABLET ORAL
Status: DISCONTINUED | OUTPATIENT
Start: 2021-06-11 | End: 2021-06-15 | Stop reason: HOSPADM

## 2021-06-11 RX ORDER — FENTANYL/BUPIVACAINE/NS/PF 2MCG/ML-.1
PLASTIC BAG, INJECTION (ML) INJECTION
Status: COMPLETED
Start: 2021-06-11 | End: 2021-06-11

## 2021-06-11 RX ORDER — BISACODYL 10 MG
10 SUPPOSITORY, RECTAL RECTAL ONCE AS NEEDED
Status: DISCONTINUED | OUTPATIENT
Start: 2021-06-11 | End: 2021-06-15 | Stop reason: HOSPADM

## 2021-06-11 RX ORDER — LIDOCAINE HCL/EPINEPHRINE/PF 2%-1:200K
VIAL (ML) INJECTION
Status: DISCONTINUED | OUTPATIENT
Start: 2021-06-11 | End: 2021-06-11

## 2021-06-11 RX ORDER — AMOXICILLIN 250 MG
1 CAPSULE ORAL NIGHTLY PRN
Status: DISCONTINUED | OUTPATIENT
Start: 2021-06-11 | End: 2021-06-15 | Stop reason: HOSPADM

## 2021-06-11 RX ORDER — OXYTOCIN 10 [USP'U]/ML
INJECTION, SOLUTION INTRAMUSCULAR; INTRAVENOUS
Status: DISCONTINUED
Start: 2021-06-11 | End: 2021-06-11 | Stop reason: WASHOUT

## 2021-06-11 RX ORDER — OXYTOCIN/RINGER'S LACTATE 30/500 ML
334 PLASTIC BAG, INJECTION (ML) INTRAVENOUS ONCE
Status: DISCONTINUED | OUTPATIENT
Start: 2021-06-11 | End: 2021-06-11

## 2021-06-11 RX ORDER — IBUPROFEN 600 MG/1
600 TABLET ORAL EVERY 6 HOURS
Status: DISCONTINUED | OUTPATIENT
Start: 2021-06-12 | End: 2021-06-11

## 2021-06-11 RX ORDER — FENTANYL CITRATE 50 UG/ML
INJECTION, SOLUTION INTRAMUSCULAR; INTRAVENOUS
Status: COMPLETED
Start: 2021-06-11 | End: 2021-06-11

## 2021-06-11 RX ORDER — OXYCODONE HYDROCHLORIDE 5 MG/1
5 TABLET ORAL EVERY 4 HOURS PRN
Status: DISCONTINUED | OUTPATIENT
Start: 2021-06-11 | End: 2021-06-12

## 2021-06-11 RX ORDER — OXYTOCIN/RINGER'S LACTATE 30/500 ML
0-30 PLASTIC BAG, INJECTION (ML) INTRAVENOUS CONTINUOUS
Status: DISCONTINUED | OUTPATIENT
Start: 2021-06-11 | End: 2021-06-11

## 2021-06-11 RX ORDER — FENTANYL/BUPIVACAINE/NS/PF 2MCG/ML-.1
PLASTIC BAG, INJECTION (ML) INJECTION CONTINUOUS
Status: DISCONTINUED | OUTPATIENT
Start: 2021-06-11 | End: 2021-06-11

## 2021-06-11 RX ORDER — MIDAZOLAM HYDROCHLORIDE 1 MG/ML
INJECTION INTRAMUSCULAR; INTRAVENOUS
Status: DISCONTINUED | OUTPATIENT
Start: 2021-06-11 | End: 2021-06-11

## 2021-06-11 RX ORDER — HYDROCODONE BITARTRATE AND ACETAMINOPHEN 5; 325 MG/1; MG/1
1 TABLET ORAL EVERY 4 HOURS PRN
Status: DISCONTINUED | OUTPATIENT
Start: 2021-06-11 | End: 2021-06-12

## 2021-06-11 RX ORDER — OXYTOCIN/RINGER'S LACTATE 30/500 ML
95 PLASTIC BAG, INJECTION (ML) INTRAVENOUS ONCE
Status: COMPLETED | OUTPATIENT
Start: 2021-06-11 | End: 2021-06-12

## 2021-06-11 RX ORDER — IBUPROFEN 600 MG/1
600 TABLET ORAL EVERY 6 HOURS
Status: DISCONTINUED | OUTPATIENT
Start: 2021-06-13 | End: 2021-06-15 | Stop reason: HOSPADM

## 2021-06-11 RX ORDER — DIPHENHYDRAMINE HCL 25 MG
25 CAPSULE ORAL EVERY 4 HOURS PRN
Status: DISCONTINUED | OUTPATIENT
Start: 2021-06-11 | End: 2021-06-15 | Stop reason: HOSPADM

## 2021-06-11 RX ORDER — SODIUM CHLORIDE, SODIUM LACTATE, POTASSIUM CHLORIDE, CALCIUM CHLORIDE 600; 310; 30; 20 MG/100ML; MG/100ML; MG/100ML; MG/100ML
INJECTION, SOLUTION INTRAVENOUS CONTINUOUS PRN
Status: DISCONTINUED | OUTPATIENT
Start: 2021-06-11 | End: 2021-06-11

## 2021-06-11 RX ORDER — ADHESIVE BANDAGE
30 BANDAGE TOPICAL 2 TIMES DAILY PRN
Status: DISCONTINUED | OUTPATIENT
Start: 2021-06-12 | End: 2021-06-15 | Stop reason: HOSPADM

## 2021-06-11 RX ORDER — KETOROLAC TROMETHAMINE 30 MG/ML
30 INJECTION, SOLUTION INTRAMUSCULAR; INTRAVENOUS EVERY 6 HOURS
Status: COMPLETED | OUTPATIENT
Start: 2021-06-11 | End: 2021-06-12

## 2021-06-11 RX ORDER — CARBOPROST TROMETHAMINE 250 UG/ML
INJECTION, SOLUTION INTRAMUSCULAR
Status: DISCONTINUED
Start: 2021-06-11 | End: 2021-06-11 | Stop reason: WASHOUT

## 2021-06-11 RX ORDER — CARBOPROST TROMETHAMINE 250 UG/ML
250 INJECTION, SOLUTION INTRAMUSCULAR
Status: DISCONTINUED | OUTPATIENT
Start: 2021-06-11 | End: 2021-06-15 | Stop reason: HOSPADM

## 2021-06-11 RX ORDER — FERROUS SULFATE 325(65) MG
324 TABLET, DELAYED RELEASE (ENTERIC COATED) ORAL DAILY
Status: DISCONTINUED | OUTPATIENT
Start: 2021-06-11 | End: 2021-06-13

## 2021-06-11 RX ORDER — BUPIVACAINE HYDROCHLORIDE 2.5 MG/ML
INJECTION, SOLUTION EPIDURAL; INFILTRATION; INTRACAUDAL
Status: DISPENSED
Start: 2021-06-11 | End: 2021-06-12

## 2021-06-11 RX ORDER — ACETAMINOPHEN 325 MG/1
650 TABLET ORAL EVERY 6 HOURS
Status: DISCONTINUED | OUTPATIENT
Start: 2021-06-12 | End: 2021-06-12

## 2021-06-11 RX ORDER — PRENATAL WITH FERROUS FUM AND FOLIC ACID 3080; 920; 120; 400; 22; 1.84; 3; 20; 10; 1; 12; 200; 27; 25; 2 [IU]/1; [IU]/1; MG/1; [IU]/1; MG/1; MG/1; MG/1; MG/1; MG/1; MG/1; UG/1; MG/1; MG/1; MG/1; MG/1
1 TABLET ORAL DAILY
Status: DISCONTINUED | OUTPATIENT
Start: 2021-06-12 | End: 2021-06-15 | Stop reason: HOSPADM

## 2021-06-11 RX ORDER — PROCHLORPERAZINE EDISYLATE 5 MG/ML
5 INJECTION INTRAMUSCULAR; INTRAVENOUS EVERY 6 HOURS PRN
Status: ACTIVE | OUTPATIENT
Start: 2021-06-11 | End: 2021-06-12

## 2021-06-11 RX ORDER — ONDANSETRON 8 MG/1
8 TABLET, ORALLY DISINTEGRATING ORAL EVERY 8 HOURS PRN
Status: DISCONTINUED | OUTPATIENT
Start: 2021-06-11 | End: 2021-06-15 | Stop reason: HOSPADM

## 2021-06-11 RX ORDER — METHYLERGONOVINE MALEATE 0.2 MG/ML
200 INJECTION INTRAVENOUS
Status: DISCONTINUED | OUTPATIENT
Start: 2021-06-11 | End: 2021-06-15 | Stop reason: HOSPADM

## 2021-06-11 RX ORDER — KETOROLAC TROMETHAMINE 30 MG/ML
30 INJECTION, SOLUTION INTRAMUSCULAR; INTRAVENOUS EVERY 6 HOURS
Status: DISCONTINUED | OUTPATIENT
Start: 2021-06-12 | End: 2021-06-11

## 2021-06-11 RX ORDER — METHYLERGONOVINE MALEATE 0.2 MG/ML
INJECTION INTRAVENOUS
Status: DISCONTINUED
Start: 2021-06-11 | End: 2021-06-11 | Stop reason: WASHOUT

## 2021-06-11 RX ORDER — ONDANSETRON 8 MG/1
8 TABLET, ORALLY DISINTEGRATING ORAL EVERY 8 HOURS PRN
Status: DISCONTINUED | OUTPATIENT
Start: 2021-06-11 | End: 2021-06-11

## 2021-06-11 RX ORDER — MISOPROSTOL 200 UG/1
TABLET ORAL
Status: DISCONTINUED
Start: 2021-06-11 | End: 2021-06-11 | Stop reason: WASHOUT

## 2021-06-11 RX ORDER — SIMETHICONE 80 MG
1 TABLET,CHEWABLE ORAL 4 TIMES DAILY PRN
Status: DISCONTINUED | OUTPATIENT
Start: 2021-06-11 | End: 2021-06-11

## 2021-06-11 RX ORDER — PHENYLEPHRINE HCL IN 0.9% NACL 1 MG/10 ML
SYRINGE (ML) INTRAVENOUS
Status: DISCONTINUED | OUTPATIENT
Start: 2021-06-11 | End: 2021-06-11

## 2021-06-11 RX ORDER — SODIUM CITRATE AND CITRIC ACID MONOHYDRATE 334; 500 MG/5ML; MG/5ML
30 SOLUTION ORAL ONCE
Status: DISCONTINUED | OUTPATIENT
Start: 2021-06-11 | End: 2021-06-11

## 2021-06-11 RX ORDER — MORPHINE SULFATE 0.5 MG/ML
INJECTION, SOLUTION EPIDURAL; INTRATHECAL; INTRAVENOUS
Status: DISCONTINUED | OUTPATIENT
Start: 2021-06-11 | End: 2021-06-11

## 2021-06-11 RX ORDER — FENTANYL CITRATE 50 UG/ML
INJECTION, SOLUTION INTRAMUSCULAR; INTRAVENOUS
Status: DISCONTINUED | OUTPATIENT
Start: 2021-06-11 | End: 2021-06-11

## 2021-06-11 RX ORDER — HYDROCODONE BITARTRATE AND ACETAMINOPHEN 10; 325 MG/1; MG/1
1 TABLET ORAL EVERY 4 HOURS PRN
Status: DISCONTINUED | OUTPATIENT
Start: 2021-06-11 | End: 2021-06-12

## 2021-06-11 RX ORDER — CHLORHEXIDINE GLUCONATE ORAL RINSE 1.2 MG/ML
10 SOLUTION DENTAL 2 TIMES DAILY
Status: CANCELLED | OUTPATIENT
Start: 2021-06-11 | End: 2021-06-16

## 2021-06-11 RX ORDER — OXYCODONE HYDROCHLORIDE 5 MG/1
10 TABLET ORAL EVERY 4 HOURS PRN
Status: DISCONTINUED | OUTPATIENT
Start: 2021-06-11 | End: 2021-06-12

## 2021-06-11 RX ADMIN — Medication 2 MILLI-UNITS/MIN: at 02:06

## 2021-06-11 RX ADMIN — SODIUM CHLORIDE, SODIUM LACTATE, POTASSIUM CHLORIDE, AND CALCIUM CHLORIDE: .6; .31; .03; .02 INJECTION, SOLUTION INTRAVENOUS at 11:06

## 2021-06-11 RX ADMIN — FENTANYL CITRATE 50 MCG: 50 INJECTION, SOLUTION INTRAMUSCULAR; INTRAVENOUS at 08:06

## 2021-06-11 RX ADMIN — KETOROLAC TROMETHAMINE 30 MG: 30 INJECTION, SOLUTION INTRAMUSCULAR; INTRAVENOUS at 10:06

## 2021-06-11 RX ADMIN — CEFAZOLIN 2 G: 330 INJECTION, POWDER, FOR SOLUTION INTRAMUSCULAR; INTRAVENOUS at 08:06

## 2021-06-11 RX ADMIN — Medication 100 MCG: at 07:06

## 2021-06-11 RX ADMIN — Medication 1 MG: at 07:06

## 2021-06-11 RX ADMIN — LIDOCAINE HYDROCHLORIDE,EPINEPHRINE BITARTRATE 5 ML: 20; .005 INJECTION, SOLUTION EPIDURAL; INFILTRATION; INTRACAUDAL; PERINEURAL at 07:06

## 2021-06-11 RX ADMIN — SODIUM CHLORIDE, SODIUM LACTATE, POTASSIUM CHLORIDE, AND CALCIUM CHLORIDE: 600; 310; 30; 20 INJECTION, SOLUTION INTRAVENOUS at 07:06

## 2021-06-11 RX ADMIN — GENTAMICIN SULFATE 328.4 MG: 40 INJECTION, SOLUTION INTRAMUSCULAR; INTRAVENOUS at 10:06

## 2021-06-11 RX ADMIN — Medication 95 MILLI-UNITS/MIN: at 10:06

## 2021-06-11 RX ADMIN — CLINDAMYCIN IN 5 PERCENT DEXTROSE 900 MG: 18 INJECTION, SOLUTION INTRAVENOUS at 10:06

## 2021-06-11 RX ADMIN — Medication 200 MCG: at 07:06

## 2021-06-11 RX ADMIN — ACETAMINOPHEN 1000 MG: 10 INJECTION INTRAVENOUS at 07:06

## 2021-06-11 RX ADMIN — MIDAZOLAM HYDROCHLORIDE 2 MG: 1 INJECTION, SOLUTION INTRAMUSCULAR; INTRAVENOUS at 07:06

## 2021-06-11 RX ADMIN — HYDROCODONE BITARTRATE AND ACETAMINOPHEN 1 TABLET: 10; 325 TABLET ORAL at 10:06

## 2021-06-11 RX ADMIN — FENTANYL CITRATE 50 MCG: 50 INJECTION, SOLUTION INTRAMUSCULAR; INTRAVENOUS at 07:06

## 2021-06-11 RX ADMIN — SODIUM CHLORIDE, SODIUM LACTATE, POTASSIUM CHLORIDE, AND CALCIUM CHLORIDE: .6; .31; .03; .02 INJECTION, SOLUTION INTRAVENOUS at 02:06

## 2021-06-11 RX ADMIN — Medication 10 ML/HR: at 01:06

## 2021-06-12 LAB
ALBUMIN SERPL BCP-MCNC: 1.7 G/DL (ref 3.5–5.2)
ALP SERPL-CCNC: 170 U/L (ref 55–135)
ALT SERPL W/O P-5'-P-CCNC: 14 U/L (ref 10–44)
ANION GAP SERPL CALC-SCNC: 7 MMOL/L (ref 8–16)
APTT BLDCRRT: 31.9 SEC (ref 21–32)
AST SERPL-CCNC: 32 U/L (ref 10–40)
BASOPHILS # BLD AUTO: 0.02 K/UL (ref 0–0.2)
BASOPHILS # BLD AUTO: 0.04 K/UL (ref 0–0.2)
BASOPHILS NFR BLD: 0.2 % (ref 0–1.9)
BASOPHILS NFR BLD: 0.3 % (ref 0–1.9)
BILIRUB SERPL-MCNC: 0.3 MG/DL (ref 0.1–1)
BUN SERPL-MCNC: 12 MG/DL (ref 6–20)
CALCIUM SERPL-MCNC: 7.5 MG/DL (ref 8.7–10.5)
CHLORIDE SERPL-SCNC: 107 MMOL/L (ref 95–110)
CO2 SERPL-SCNC: 18 MMOL/L (ref 23–29)
CREAT SERPL-MCNC: 0.7 MG/DL (ref 0.5–1.4)
CREAT UR-MCNC: 174.2 MG/DL (ref 15–325)
DIFFERENTIAL METHOD: ABNORMAL
DIFFERENTIAL METHOD: ABNORMAL
EOSINOPHIL # BLD AUTO: 0 K/UL (ref 0–0.5)
EOSINOPHIL # BLD AUTO: 0 K/UL (ref 0–0.5)
EOSINOPHIL NFR BLD: 0 % (ref 0–8)
EOSINOPHIL NFR BLD: 0.1 % (ref 0–8)
ERYTHROCYTE [DISTWIDTH] IN BLOOD BY AUTOMATED COUNT: 17.7 % (ref 11.5–14.5)
ERYTHROCYTE [DISTWIDTH] IN BLOOD BY AUTOMATED COUNT: 17.9 % (ref 11.5–14.5)
EST. GFR  (AFRICAN AMERICAN): >60 ML/MIN/1.73 M^2
EST. GFR  (NON AFRICAN AMERICAN): >60 ML/MIN/1.73 M^2
FIBRINOGEN PPP-MCNC: 302 MG/DL (ref 182–400)
GLUCOSE SERPL-MCNC: 222 MG/DL (ref 70–110)
HCT VFR BLD AUTO: 33.1 % (ref 37–48.5)
HCT VFR BLD AUTO: 34.2 % (ref 37–48.5)
HGB BLD-MCNC: 11.3 G/DL (ref 12–16)
HGB BLD-MCNC: 11.7 G/DL (ref 12–16)
IMM GRANULOCYTES # BLD AUTO: 0.04 K/UL (ref 0–0.04)
IMM GRANULOCYTES # BLD AUTO: 0.05 K/UL (ref 0–0.04)
IMM GRANULOCYTES NFR BLD AUTO: 0.3 % (ref 0–0.5)
IMM GRANULOCYTES NFR BLD AUTO: 0.4 % (ref 0–0.5)
INR PPP: 0.9 (ref 0.8–1.2)
LYMPHOCYTES # BLD AUTO: 0.7 K/UL (ref 1–4.8)
LYMPHOCYTES # BLD AUTO: 1.1 K/UL (ref 1–4.8)
LYMPHOCYTES NFR BLD: 5.6 % (ref 18–48)
LYMPHOCYTES NFR BLD: 9.4 % (ref 18–48)
MCH RBC QN AUTO: 31.2 PG (ref 27–31)
MCH RBC QN AUTO: 31.3 PG (ref 27–31)
MCHC RBC AUTO-ENTMCNC: 34.1 G/DL (ref 32–36)
MCHC RBC AUTO-ENTMCNC: 34.2 G/DL (ref 32–36)
MCV RBC AUTO: 91 FL (ref 82–98)
MCV RBC AUTO: 91 FL (ref 82–98)
MONOCYTES # BLD AUTO: 0.8 K/UL (ref 0.3–1)
MONOCYTES # BLD AUTO: 0.9 K/UL (ref 0.3–1)
MONOCYTES NFR BLD: 6.4 % (ref 4–15)
MONOCYTES NFR BLD: 7.9 % (ref 4–15)
NEUTROPHILS # BLD AUTO: 10.3 K/UL (ref 1.8–7.7)
NEUTROPHILS # BLD AUTO: 9.6 K/UL (ref 1.8–7.7)
NEUTROPHILS NFR BLD: 82.1 % (ref 38–73)
NEUTROPHILS NFR BLD: 87.3 % (ref 38–73)
NRBC BLD-RTO: 0 /100 WBC
NRBC BLD-RTO: 0 /100 WBC
PLATELET # BLD AUTO: 143 K/UL (ref 150–450)
PLATELET # BLD AUTO: 149 K/UL (ref 150–450)
PMV BLD AUTO: 10.3 FL (ref 9.2–12.9)
PMV BLD AUTO: 9.9 FL (ref 9.2–12.9)
POTASSIUM SERPL-SCNC: 4.2 MMOL/L (ref 3.5–5.1)
PROT SERPL-MCNC: 4.2 G/DL (ref 6–8.4)
PROT UR-MCNC: 143 MG/DL (ref 0–15)
PROT/CREAT UR: 0.82 MG/G{CREAT} (ref 0–0.2)
PROTHROMBIN TIME: 10.3 SEC (ref 9–12.5)
RBC # BLD AUTO: 3.62 M/UL (ref 4–5.4)
RBC # BLD AUTO: 3.74 M/UL (ref 4–5.4)
SODIUM SERPL-SCNC: 132 MMOL/L (ref 136–145)
WBC # BLD AUTO: 11.65 K/UL (ref 3.9–12.7)
WBC # BLD AUTO: 11.78 K/UL (ref 3.9–12.7)

## 2021-06-12 PROCEDURE — 80053 COMPREHEN METABOLIC PANEL: CPT | Performed by: OBSTETRICS & GYNECOLOGY

## 2021-06-12 PROCEDURE — 25000003 PHARM REV CODE 250: Performed by: STUDENT IN AN ORGANIZED HEALTH CARE EDUCATION/TRAINING PROGRAM

## 2021-06-12 PROCEDURE — 85610 PROTHROMBIN TIME: CPT | Performed by: STUDENT IN AN ORGANIZED HEALTH CARE EDUCATION/TRAINING PROGRAM

## 2021-06-12 PROCEDURE — 82570 ASSAY OF URINE CREATININE: CPT | Performed by: STUDENT IN AN ORGANIZED HEALTH CARE EDUCATION/TRAINING PROGRAM

## 2021-06-12 PROCEDURE — 63600175 PHARM REV CODE 636 W HCPCS: Performed by: OBSTETRICS & GYNECOLOGY

## 2021-06-12 PROCEDURE — 11000001 HC ACUTE MED/SURG PRIVATE ROOM

## 2021-06-12 PROCEDURE — 85730 THROMBOPLASTIN TIME PARTIAL: CPT | Performed by: STUDENT IN AN ORGANIZED HEALTH CARE EDUCATION/TRAINING PROGRAM

## 2021-06-12 PROCEDURE — 63600175 PHARM REV CODE 636 W HCPCS: Performed by: STUDENT IN AN ORGANIZED HEALTH CARE EDUCATION/TRAINING PROGRAM

## 2021-06-12 PROCEDURE — 36415 COLL VENOUS BLD VENIPUNCTURE: CPT | Performed by: STUDENT IN AN ORGANIZED HEALTH CARE EDUCATION/TRAINING PROGRAM

## 2021-06-12 PROCEDURE — 85025 COMPLETE CBC W/AUTO DIFF WBC: CPT | Performed by: STUDENT IN AN ORGANIZED HEALTH CARE EDUCATION/TRAINING PROGRAM

## 2021-06-12 PROCEDURE — 85384 FIBRINOGEN ACTIVITY: CPT | Performed by: STUDENT IN AN ORGANIZED HEALTH CARE EDUCATION/TRAINING PROGRAM

## 2021-06-12 RX ORDER — SODIUM CHLORIDE, SODIUM LACTATE, POTASSIUM CHLORIDE, CALCIUM CHLORIDE 600; 310; 30; 20 MG/100ML; MG/100ML; MG/100ML; MG/100ML
INJECTION, SOLUTION INTRAVENOUS
Status: DISCONTINUED | OUTPATIENT
Start: 2021-06-12 | End: 2021-06-15 | Stop reason: HOSPADM

## 2021-06-12 RX ORDER — OXYCODONE AND ACETAMINOPHEN 10; 325 MG/1; MG/1
1 TABLET ORAL EVERY 4 HOURS PRN
Status: DISCONTINUED | OUTPATIENT
Start: 2021-06-12 | End: 2021-06-15 | Stop reason: HOSPADM

## 2021-06-12 RX ORDER — OXYCODONE AND ACETAMINOPHEN 5; 325 MG/1; MG/1
1 TABLET ORAL EVERY 4 HOURS PRN
Status: DISCONTINUED | OUTPATIENT
Start: 2021-06-12 | End: 2021-06-15 | Stop reason: HOSPADM

## 2021-06-12 RX ORDER — MISOPROSTOL 200 UG/1
200 TABLET ORAL EVERY 6 HOURS
Status: COMPLETED | OUTPATIENT
Start: 2021-06-12 | End: 2021-06-12

## 2021-06-12 RX ORDER — ACETAMINOPHEN 325 MG/1
650 TABLET ORAL EVERY 6 HOURS
Status: DISPENSED | OUTPATIENT
Start: 2021-06-12 | End: 2021-06-12

## 2021-06-12 RX ADMIN — OXYCODONE AND ACETAMINOPHEN 1 TABLET: 10; 325 TABLET ORAL at 11:06

## 2021-06-12 RX ADMIN — OXYCODONE HYDROCHLORIDE 10 MG: 5 TABLET ORAL at 12:06

## 2021-06-12 RX ADMIN — MISOPROSTOL 200 MCG: 200 TABLET ORAL at 12:06

## 2021-06-12 RX ADMIN — SIMETHICONE 80 MG: 80 TABLET, CHEWABLE ORAL at 06:06

## 2021-06-12 RX ADMIN — ACETAMINOPHEN 650 MG: 325 TABLET, FILM COATED ORAL at 11:06

## 2021-06-12 RX ADMIN — MISOPROSTOL 200 MCG: 200 TABLET ORAL at 07:06

## 2021-06-12 RX ADMIN — DOCUSATE SODIUM 200 MG: 100 CAPSULE, LIQUID FILLED ORAL at 07:06

## 2021-06-12 RX ADMIN — DOCUSATE SODIUM 200 MG: 100 CAPSULE, LIQUID FILLED ORAL at 08:06

## 2021-06-12 RX ADMIN — HYDROCODONE BITARTRATE AND ACETAMINOPHEN 1 TABLET: 10; 325 TABLET ORAL at 04:06

## 2021-06-12 RX ADMIN — KETOROLAC TROMETHAMINE 30 MG: 30 INJECTION, SOLUTION INTRAMUSCULAR; INTRAVENOUS at 06:06

## 2021-06-12 RX ADMIN — DOCUSATE SODIUM 200 MG: 100 CAPSULE, LIQUID FILLED ORAL at 12:06

## 2021-06-12 RX ADMIN — PRENATAL VIT W/ FE FUMARATE-FA TAB 27-0.8 MG 1 TABLET: 27-0.8 TAB at 07:06

## 2021-06-12 RX ADMIN — OXYCODONE HYDROCHLORIDE 10 MG: 5 TABLET ORAL at 07:06

## 2021-06-12 RX ADMIN — KETOROLAC TROMETHAMINE 30 MG: 30 INJECTION, SOLUTION INTRAMUSCULAR; INTRAVENOUS at 05:06

## 2021-06-12 RX ADMIN — KETOROLAC TROMETHAMINE 30 MG: 30 INJECTION, SOLUTION INTRAMUSCULAR; INTRAVENOUS at 11:06

## 2021-06-12 RX ADMIN — CLINDAMYCIN IN 5 PERCENT DEXTROSE 900 MG: 18 INJECTION, SOLUTION INTRAVENOUS at 06:06

## 2021-06-12 RX ADMIN — CLINDAMYCIN IN 5 PERCENT DEXTROSE 900 MG: 18 INJECTION, SOLUTION INTRAVENOUS at 03:06

## 2021-06-12 RX ADMIN — SIMETHICONE 80 MG: 80 TABLET, CHEWABLE ORAL at 08:06

## 2021-06-12 RX ADMIN — SODIUM CHLORIDE, SODIUM LACTATE, POTASSIUM CHLORIDE, AND CALCIUM CHLORIDE 1000 ML: .6; .31; .03; .02 INJECTION, SOLUTION INTRAVENOUS at 12:06

## 2021-06-12 RX ADMIN — SERTRALINE HYDROCHLORIDE 25 MG: 25 TABLET ORAL at 07:06

## 2021-06-12 RX ADMIN — MISOPROSTOL 200 MCG: 200 TABLET ORAL at 05:06

## 2021-06-12 RX ADMIN — SODIUM CHLORIDE, SODIUM LACTATE, POTASSIUM CHLORIDE, AND CALCIUM CHLORIDE: .6; .31; .03; .02 INJECTION, SOLUTION INTRAVENOUS at 06:06

## 2021-06-12 RX ADMIN — IBUPROFEN 600 MG: 600 TABLET ORAL at 11:06

## 2021-06-12 RX ADMIN — ACETAMINOPHEN 650 MG: 325 TABLET, FILM COATED ORAL at 01:06

## 2021-06-12 RX ADMIN — MISOPROSTOL 200 MCG: 200 TABLET ORAL at 01:06

## 2021-06-12 RX ADMIN — CLINDAMYCIN IN 5 PERCENT DEXTROSE 900 MG: 18 INJECTION, SOLUTION INTRAVENOUS at 10:06

## 2021-06-12 RX ADMIN — OXYCODONE HYDROCHLORIDE 10 MG: 5 TABLET ORAL at 08:06

## 2021-06-12 RX ADMIN — ACETAMINOPHEN 650 MG: 325 TABLET, FILM COATED ORAL at 05:06

## 2021-06-13 LAB
BASOPHILS # BLD AUTO: 0.02 K/UL (ref 0–0.2)
BASOPHILS NFR BLD: 0.2 % (ref 0–1.9)
DIFFERENTIAL METHOD: ABNORMAL
EOSINOPHIL # BLD AUTO: 0.1 K/UL (ref 0–0.5)
EOSINOPHIL NFR BLD: 1.6 % (ref 0–8)
ERYTHROCYTE [DISTWIDTH] IN BLOOD BY AUTOMATED COUNT: 18.6 % (ref 11.5–14.5)
HCT VFR BLD AUTO: 31.3 % (ref 37–48.5)
HGB BLD-MCNC: 10.3 G/DL (ref 12–16)
IMM GRANULOCYTES # BLD AUTO: 0.05 K/UL (ref 0–0.04)
IMM GRANULOCYTES NFR BLD AUTO: 0.6 % (ref 0–0.5)
LYMPHOCYTES # BLD AUTO: 1.3 K/UL (ref 1–4.8)
LYMPHOCYTES NFR BLD: 14.8 % (ref 18–48)
MCH RBC QN AUTO: 30.8 PG (ref 27–31)
MCHC RBC AUTO-ENTMCNC: 32.9 G/DL (ref 32–36)
MCV RBC AUTO: 94 FL (ref 82–98)
MONOCYTES # BLD AUTO: 0.6 K/UL (ref 0.3–1)
MONOCYTES NFR BLD: 6.2 % (ref 4–15)
NEUTROPHILS # BLD AUTO: 6.8 K/UL (ref 1.8–7.7)
NEUTROPHILS NFR BLD: 76.6 % (ref 38–73)
NRBC BLD-RTO: 0 /100 WBC
PLATELET # BLD AUTO: 152 K/UL (ref 150–450)
PMV BLD AUTO: 9.3 FL (ref 9.2–12.9)
POCT GLUCOSE: 92 MG/DL (ref 70–110)
RBC # BLD AUTO: 3.34 M/UL (ref 4–5.4)
WBC # BLD AUTO: 8.88 K/UL (ref 3.9–12.7)

## 2021-06-13 PROCEDURE — 25000003 PHARM REV CODE 250: Performed by: OBSTETRICS & GYNECOLOGY

## 2021-06-13 PROCEDURE — 11000001 HC ACUTE MED/SURG PRIVATE ROOM

## 2021-06-13 PROCEDURE — 25000003 PHARM REV CODE 250: Performed by: STUDENT IN AN ORGANIZED HEALTH CARE EDUCATION/TRAINING PROGRAM

## 2021-06-13 PROCEDURE — 85025 COMPLETE CBC W/AUTO DIFF WBC: CPT | Performed by: STUDENT IN AN ORGANIZED HEALTH CARE EDUCATION/TRAINING PROGRAM

## 2021-06-13 PROCEDURE — 36415 COLL VENOUS BLD VENIPUNCTURE: CPT | Performed by: STUDENT IN AN ORGANIZED HEALTH CARE EDUCATION/TRAINING PROGRAM

## 2021-06-13 RX ORDER — FERROUS SULFATE 325(65) MG
325 TABLET, DELAYED RELEASE (ENTERIC COATED) ORAL DAILY
Status: DISCONTINUED | OUTPATIENT
Start: 2021-06-13 | End: 2021-06-15 | Stop reason: HOSPADM

## 2021-06-13 RX ADMIN — FERROUS SULFATE TAB EC 325 MG (65 MG FE EQUIVALENT) 325 MG: 325 (65 FE) TABLET DELAYED RESPONSE at 06:06

## 2021-06-13 RX ADMIN — OXYCODONE AND ACETAMINOPHEN 1 TABLET: 10; 325 TABLET ORAL at 03:06

## 2021-06-13 RX ADMIN — OXYCODONE AND ACETAMINOPHEN 1 TABLET: 10; 325 TABLET ORAL at 07:06

## 2021-06-13 RX ADMIN — SERTRALINE HYDROCHLORIDE 25 MG: 25 TABLET ORAL at 10:06

## 2021-06-13 RX ADMIN — IBUPROFEN 600 MG: 600 TABLET ORAL at 06:06

## 2021-06-13 RX ADMIN — IBUPROFEN 600 MG: 600 TABLET ORAL at 12:06

## 2021-06-13 RX ADMIN — PRENATAL VIT W/ FE FUMARATE-FA TAB 27-0.8 MG 1 TABLET: 27-0.8 TAB at 10:06

## 2021-06-13 RX ADMIN — OXYCODONE AND ACETAMINOPHEN 1 TABLET: 10; 325 TABLET ORAL at 10:06

## 2021-06-13 RX ADMIN — DOCUSATE SODIUM 200 MG: 100 CAPSULE, LIQUID FILLED ORAL at 10:06

## 2021-06-13 RX ADMIN — IBUPROFEN 600 MG: 600 TABLET ORAL at 05:06

## 2021-06-14 PROCEDURE — 11000001 HC ACUTE MED/SURG PRIVATE ROOM

## 2021-06-14 PROCEDURE — 25000003 PHARM REV CODE 250: Performed by: STUDENT IN AN ORGANIZED HEALTH CARE EDUCATION/TRAINING PROGRAM

## 2021-06-14 PROCEDURE — 25000003 PHARM REV CODE 250: Performed by: OBSTETRICS & GYNECOLOGY

## 2021-06-14 RX ORDER — POLYETHYLENE GLYCOL 3350 17 G/17G
17 POWDER, FOR SOLUTION ORAL DAILY
Status: DISCONTINUED | OUTPATIENT
Start: 2021-06-14 | End: 2021-06-15 | Stop reason: HOSPADM

## 2021-06-14 RX ADMIN — PRENATAL VIT W/ FE FUMARATE-FA TAB 27-0.8 MG 1 TABLET: 27-0.8 TAB at 07:06

## 2021-06-14 RX ADMIN — SIMETHICONE 80 MG: 80 TABLET, CHEWABLE ORAL at 07:06

## 2021-06-14 RX ADMIN — FERROUS SULFATE TAB EC 325 MG (65 MG FE EQUIVALENT) 325 MG: 325 (65 FE) TABLET DELAYED RESPONSE at 07:06

## 2021-06-14 RX ADMIN — OXYCODONE HYDROCHLORIDE AND ACETAMINOPHEN 1 TABLET: 5; 325 TABLET ORAL at 10:06

## 2021-06-14 RX ADMIN — OXYCODONE AND ACETAMINOPHEN 1 TABLET: 10; 325 TABLET ORAL at 12:06

## 2021-06-14 RX ADMIN — DOCUSATE SODIUM 200 MG: 100 CAPSULE, LIQUID FILLED ORAL at 10:06

## 2021-06-14 RX ADMIN — IBUPROFEN 600 MG: 600 TABLET ORAL at 12:06

## 2021-06-14 RX ADMIN — POLYETHYLENE GLYCOL 3350 17 G: 17 POWDER, FOR SOLUTION ORAL at 07:06

## 2021-06-14 RX ADMIN — IBUPROFEN 600 MG: 600 TABLET ORAL at 05:06

## 2021-06-14 RX ADMIN — OXYCODONE HYDROCHLORIDE AND ACETAMINOPHEN 1 TABLET: 5; 325 TABLET ORAL at 04:06

## 2021-06-14 RX ADMIN — DOCUSATE SODIUM 200 MG: 100 CAPSULE, LIQUID FILLED ORAL at 07:06

## 2021-06-14 RX ADMIN — SERTRALINE HYDROCHLORIDE 25 MG: 25 TABLET ORAL at 07:06

## 2021-06-14 RX ADMIN — IBUPROFEN 600 MG: 600 TABLET ORAL at 06:06

## 2021-06-15 VITALS
DIASTOLIC BLOOD PRESSURE: 77 MMHG | SYSTOLIC BLOOD PRESSURE: 148 MMHG | RESPIRATION RATE: 18 BRPM | BODY MASS INDEX: 27.77 KG/M2 | OXYGEN SATURATION: 98 % | HEIGHT: 65 IN | WEIGHT: 166.69 LBS | HEART RATE: 94 BPM | TEMPERATURE: 98 F

## 2021-06-15 PROCEDURE — 25000003 PHARM REV CODE 250: Performed by: OBSTETRICS & GYNECOLOGY

## 2021-06-15 PROCEDURE — 25000003 PHARM REV CODE 250: Performed by: STUDENT IN AN ORGANIZED HEALTH CARE EDUCATION/TRAINING PROGRAM

## 2021-06-15 RX ORDER — NIFEDIPINE 30 MG/1
30 TABLET, EXTENDED RELEASE ORAL DAILY
Status: DISCONTINUED | OUTPATIENT
Start: 2021-06-15 | End: 2021-06-15 | Stop reason: HOSPADM

## 2021-06-15 RX ORDER — OXYCODONE AND ACETAMINOPHEN 5; 325 MG/1; MG/1
1 TABLET ORAL EVERY 4 HOURS PRN
Qty: 20 TABLET | Refills: 0 | Status: SHIPPED | OUTPATIENT
Start: 2021-06-15

## 2021-06-15 RX ORDER — DOCUSATE SODIUM 100 MG/1
200 CAPSULE, LIQUID FILLED ORAL 2 TIMES DAILY
Qty: 30 CAPSULE | Refills: 1 | Status: SHIPPED | OUTPATIENT
Start: 2021-06-15

## 2021-06-15 RX ORDER — NIFEDIPINE 30 MG/1
30 TABLET, EXTENDED RELEASE ORAL DAILY
Qty: 30 TABLET | Refills: 11 | Status: SHIPPED | OUTPATIENT
Start: 2021-06-15 | End: 2022-06-15

## 2021-06-15 RX ORDER — IBUPROFEN 600 MG/1
600 TABLET ORAL EVERY 6 HOURS
Qty: 30 TABLET | Refills: 2 | Status: SHIPPED | OUTPATIENT
Start: 2021-06-15

## 2021-06-15 RX ADMIN — PRENATAL VIT W/ FE FUMARATE-FA TAB 27-0.8 MG 1 TABLET: 27-0.8 TAB at 09:06

## 2021-06-15 RX ADMIN — DOCUSATE SODIUM 200 MG: 100 CAPSULE, LIQUID FILLED ORAL at 09:06

## 2021-06-15 RX ADMIN — FERROUS SULFATE TAB EC 325 MG (65 MG FE EQUIVALENT) 325 MG: 325 (65 FE) TABLET DELAYED RESPONSE at 09:06

## 2021-06-15 RX ADMIN — IBUPROFEN 600 MG: 600 TABLET ORAL at 12:06

## 2021-06-15 RX ADMIN — POLYETHYLENE GLYCOL 3350 17 G: 17 POWDER, FOR SOLUTION ORAL at 09:06

## 2021-06-15 RX ADMIN — SIMETHICONE 80 MG: 80 TABLET, CHEWABLE ORAL at 12:06

## 2021-06-15 RX ADMIN — IBUPROFEN 600 MG: 600 TABLET ORAL at 06:06

## 2021-06-15 RX ADMIN — NIFEDIPINE 30 MG: 30 TABLET, FILM COATED, EXTENDED RELEASE ORAL at 09:06

## 2021-06-15 RX ADMIN — SERTRALINE HYDROCHLORIDE 25 MG: 25 TABLET ORAL at 09:06

## 2021-06-17 ENCOUNTER — POSTPARTUM VISIT (OUTPATIENT)
Dept: OBSTETRICS AND GYNECOLOGY | Facility: CLINIC | Age: 41
End: 2021-06-17
Payer: COMMERCIAL

## 2021-06-17 VITALS — HEIGHT: 65 IN | SYSTOLIC BLOOD PRESSURE: 128 MMHG | BODY MASS INDEX: 27.73 KG/M2 | DIASTOLIC BLOOD PRESSURE: 86 MMHG

## 2021-06-17 DIAGNOSIS — K64.9 HEMORRHOIDS, UNSPECIFIED HEMORRHOID TYPE: ICD-10-CM

## 2021-06-17 PROCEDURE — 1111F DSCHRG MED/CURRENT MED MERGE: CPT | Mod: CPTII,S$GLB,, | Performed by: OBSTETRICS & GYNECOLOGY

## 2021-06-17 PROCEDURE — 99213 PR OFFICE/OUTPT VISIT, EST, LEVL III, 20-29 MIN: ICD-10-PCS | Mod: 24,S$GLB,, | Performed by: OBSTETRICS & GYNECOLOGY

## 2021-06-17 PROCEDURE — 99213 OFFICE O/P EST LOW 20 MIN: CPT | Mod: 24,S$GLB,, | Performed by: OBSTETRICS & GYNECOLOGY

## 2021-06-17 PROCEDURE — 99999 PR PBB SHADOW E&M-EST. PATIENT-LVL III: ICD-10-PCS | Mod: PBBFAC,,, | Performed by: OBSTETRICS & GYNECOLOGY

## 2021-06-17 PROCEDURE — 99999 PR PBB SHADOW E&M-EST. PATIENT-LVL III: CPT | Mod: PBBFAC,,, | Performed by: OBSTETRICS & GYNECOLOGY

## 2021-06-17 PROCEDURE — 1111F PR DISCHARGE MEDS RECONCILED W/ CURRENT OUTPATIENT MED LIST: ICD-10-PCS | Mod: CPTII,S$GLB,, | Performed by: OBSTETRICS & GYNECOLOGY

## 2021-06-18 LAB
FINAL PATHOLOGIC DIAGNOSIS: NORMAL
GROSS: NORMAL
Lab: NORMAL

## 2021-06-28 ENCOUNTER — PATIENT MESSAGE (OUTPATIENT)
Dept: OBSTETRICS AND GYNECOLOGY | Facility: CLINIC | Age: 41
End: 2021-06-28

## 2021-07-01 ENCOUNTER — TELEPHONE (OUTPATIENT)
Dept: MATERNAL FETAL MEDICINE | Facility: CLINIC | Age: 41
End: 2021-07-01

## 2021-07-09 ENCOUNTER — PATIENT MESSAGE (OUTPATIENT)
Dept: OBSTETRICS AND GYNECOLOGY | Facility: CLINIC | Age: 41
End: 2021-07-09

## 2021-07-14 ENCOUNTER — TELEPHONE (OUTPATIENT)
Dept: OBSTETRICS AND GYNECOLOGY | Facility: CLINIC | Age: 41
End: 2021-07-14

## 2021-07-14 RX ORDER — HYDROCORTISONE 25 MG/G
CREAM TOPICAL 2 TIMES DAILY
Qty: 28 G | Refills: 2 | Status: SHIPPED | OUTPATIENT
Start: 2021-07-14

## 2021-07-14 RX ORDER — SERTRALINE HYDROCHLORIDE 50 MG/1
50 TABLET, FILM COATED ORAL DAILY
Qty: 30 TABLET | Refills: 6 | Status: SHIPPED | OUTPATIENT
Start: 2021-07-14 | End: 2022-02-22

## 2021-07-19 ENCOUNTER — PATIENT MESSAGE (OUTPATIENT)
Dept: OBSTETRICS AND GYNECOLOGY | Facility: CLINIC | Age: 41
End: 2021-07-19

## 2021-08-06 ENCOUNTER — PATIENT MESSAGE (OUTPATIENT)
Dept: OBSTETRICS AND GYNECOLOGY | Facility: CLINIC | Age: 41
End: 2021-08-06

## 2021-08-17 ENCOUNTER — PATIENT MESSAGE (OUTPATIENT)
Dept: OBSTETRICS AND GYNECOLOGY | Facility: CLINIC | Age: 41
End: 2021-08-17

## 2021-08-19 ENCOUNTER — POSTPARTUM VISIT (OUTPATIENT)
Dept: OBSTETRICS AND GYNECOLOGY | Facility: CLINIC | Age: 41
End: 2021-08-19
Payer: COMMERCIAL

## 2021-08-19 VITALS
HEIGHT: 65 IN | DIASTOLIC BLOOD PRESSURE: 84 MMHG | SYSTOLIC BLOOD PRESSURE: 118 MMHG | BODY MASS INDEX: 21.38 KG/M2 | WEIGHT: 128.31 LBS

## 2021-08-19 DIAGNOSIS — R10.2 PERINEAL PAIN: ICD-10-CM

## 2021-08-19 PROCEDURE — 0503F POSTPARTUM CARE VISIT: CPT | Mod: CPTII,S$GLB,, | Performed by: OBSTETRICS & GYNECOLOGY

## 2021-08-19 PROCEDURE — 99999 PR PBB SHADOW E&M-EST. PATIENT-LVL III: ICD-10-PCS | Mod: PBBFAC,,, | Performed by: OBSTETRICS & GYNECOLOGY

## 2021-08-19 PROCEDURE — 0503F PR POSTPARTUM CARE VISIT: ICD-10-PCS | Mod: CPTII,S$GLB,, | Performed by: OBSTETRICS & GYNECOLOGY

## 2021-08-19 PROCEDURE — 99999 PR PBB SHADOW E&M-EST. PATIENT-LVL III: CPT | Mod: PBBFAC,,, | Performed by: OBSTETRICS & GYNECOLOGY

## 2022-06-10 ENCOUNTER — PATIENT MESSAGE (OUTPATIENT)
Dept: OBSTETRICS AND GYNECOLOGY | Facility: CLINIC | Age: 42
End: 2022-06-10
Payer: COMMERCIAL

## 2022-06-10 RX ORDER — DICLOXACILLIN SODIUM 250 MG/1
500 CAPSULE ORAL 4 TIMES DAILY
Qty: 80 CAPSULE | Refills: 0 | Status: SHIPPED | OUTPATIENT
Start: 2022-06-10 | End: 2022-06-20

## 2022-10-28 RX ORDER — SERTRALINE HYDROCHLORIDE 50 MG/1
50 TABLET, FILM COATED ORAL DAILY
Qty: 30 TABLET | Refills: 6 | Status: CANCELLED | OUTPATIENT
Start: 2022-10-28